# Patient Record
Sex: MALE | Race: BLACK OR AFRICAN AMERICAN | Employment: UNEMPLOYED | URBAN - METROPOLITAN AREA
[De-identification: names, ages, dates, MRNs, and addresses within clinical notes are randomized per-mention and may not be internally consistent; named-entity substitution may affect disease eponyms.]

---

## 2018-03-14 ENCOUNTER — HOSPITAL ENCOUNTER (OUTPATIENT)
Dept: NUCLEAR MEDICINE | Age: 63
Discharge: HOME | End: 2018-03-14
Attending: FAMILY MEDICINE
Payer: MEDICARE

## 2018-03-14 ENCOUNTER — HOSPITAL ENCOUNTER (OUTPATIENT)
Dept: NON INVASIVE DIAGNOSTICS | Age: 63
Discharge: HOME | End: 2018-03-14
Attending: FAMILY MEDICINE
Payer: MEDICARE

## 2018-03-14 DIAGNOSIS — R07.89 CHEST DISCOMFORT: ICD-10-CM

## 2018-03-14 LAB
ATTENDING PHYSICIAN, CST07: NORMAL
DIAGNOSIS, 93000: NORMAL
DUKE TM SCORE RESULT, CST14: NORMAL
DUKE TREADMILL SCORE, CST13: 5456
ECG INTERP BEFORE EX, CST11: NORMAL
ECG INTERP DURING EX, CST12: NORMAL
FUNCTIONAL CAPACITY, CST17: NORMAL
KNOWN CARDIAC CONDITION, CST08: NORMAL
MAX. DIASTOLIC BP, CST04: 88 MMHG
MAX. HEART RATE, CST05: 90 BPM
MAX. SYSTOLIC BP, CST03: 154 MMHG
OVERALL BP RESPONSE TO EXERCISE, CST16: NORMAL
OVERALL HR RESPONSE TO EXERCISE, CST15: NORMAL
PEAK EX METS, CST10: 1 METS
PROTOCOL NAME, CST01: NORMAL
TEST INDICATION, CST09: NORMAL

## 2018-03-14 PROCEDURE — 78452 HT MUSCLE IMAGE SPECT MULT: CPT

## 2018-03-14 PROCEDURE — 93017 CV STRESS TEST TRACING ONLY: CPT

## 2018-03-14 PROCEDURE — 74011250636 HC RX REV CODE- 250/636: Performed by: INTERNAL MEDICINE

## 2018-03-14 RX ORDER — SODIUM CHLORIDE 0.9 % (FLUSH) 0.9 %
SYRINGE (ML) INJECTION
Status: DISCONTINUED
Start: 2018-03-14 | End: 2018-03-18 | Stop reason: HOSPADM

## 2018-03-14 RX ORDER — SODIUM CHLORIDE 0.9 % (FLUSH) 0.9 %
10 SYRINGE (ML) INJECTION AS NEEDED
Status: DISCONTINUED | OUTPATIENT
Start: 2018-03-14 | End: 2018-03-18 | Stop reason: HOSPADM

## 2018-03-14 RX ORDER — SODIUM CHLORIDE 0.9 % (FLUSH) 0.9 %
10 SYRINGE (ML) INJECTION AS NEEDED
Status: DISCONTINUED | OUTPATIENT
Start: 2018-03-14 | End: 2018-03-14

## 2018-03-14 RX ADMIN — Medication 10 ML: at 10:28

## 2018-03-14 RX ADMIN — REGADENOSON 0.4 MG: 0.08 INJECTION, SOLUTION INTRAVENOUS at 10:46

## 2020-08-27 ENCOUNTER — HOSPITAL ENCOUNTER (OUTPATIENT)
Dept: CT IMAGING | Age: 65
Discharge: HOME | End: 2020-08-27
Attending: INTERNAL MEDICINE
Payer: MEDICARE

## 2020-08-27 DIAGNOSIS — J43.9 EMPHYSEMA LUNG (HCC): ICD-10-CM

## 2020-08-27 DIAGNOSIS — R97.20 ELEVATED PSA: ICD-10-CM

## 2020-08-27 LAB — CREAT BLD-MCNC: 1.2 MG/DL (ref 0.6–1.3)

## 2020-08-27 PROCEDURE — 82565 ASSAY OF CREATININE: CPT

## 2020-08-27 PROCEDURE — 74011000255 HC RX REV CODE- 255: Performed by: RADIOLOGY

## 2020-08-27 PROCEDURE — 74177 CT ABD & PELVIS W/CONTRAST: CPT

## 2020-08-27 PROCEDURE — 74011000636 HC RX REV CODE- 636: Performed by: RADIOLOGY

## 2020-08-27 PROCEDURE — 71260 CT THORAX DX C+: CPT

## 2020-08-27 RX ORDER — BARIUM SULFATE 20 MG/ML
900 SUSPENSION ORAL
Status: COMPLETED | OUTPATIENT
Start: 2020-08-27 | End: 2020-08-27

## 2020-08-27 RX ORDER — SODIUM CHLORIDE 0.9 % (FLUSH) 0.9 %
10 SYRINGE (ML) INJECTION
Status: COMPLETED | OUTPATIENT
Start: 2020-08-27 | End: 2020-08-27

## 2020-08-27 RX ADMIN — IOPAMIDOL 100 ML: 755 INJECTION, SOLUTION INTRAVENOUS at 10:59

## 2020-08-27 RX ADMIN — BARIUM SULFATE 900 ML: 20 SUSPENSION ORAL at 10:59

## 2020-08-27 RX ADMIN — Medication 10 ML: at 10:59

## 2021-01-01 ENCOUNTER — HOSPICE ADMISSION (OUTPATIENT)
Dept: HOSPICE | Facility: HOSPICE | Age: 66
End: 2021-01-01

## 2021-01-01 ENCOUNTER — APPOINTMENT (OUTPATIENT)
Dept: GENERAL RADIOLOGY | Age: 66
DRG: 064 | End: 2021-01-01
Attending: STUDENT IN AN ORGANIZED HEALTH CARE EDUCATION/TRAINING PROGRAM
Payer: MEDICARE

## 2021-01-01 ENCOUNTER — APPOINTMENT (OUTPATIENT)
Dept: GENERAL RADIOLOGY | Age: 66
DRG: 064 | End: 2021-01-01
Attending: INTERNAL MEDICINE
Payer: MEDICARE

## 2021-01-01 ENCOUNTER — APPOINTMENT (OUTPATIENT)
Dept: MRI IMAGING | Age: 66
DRG: 064 | End: 2021-01-01
Attending: GENERAL ACUTE CARE HOSPITAL
Payer: MEDICARE

## 2021-01-01 ENCOUNTER — APPOINTMENT (OUTPATIENT)
Dept: CT IMAGING | Age: 66
DRG: 064 | End: 2021-01-01
Attending: PSYCHIATRY & NEUROLOGY
Payer: MEDICARE

## 2021-01-01 ENCOUNTER — APPOINTMENT (OUTPATIENT)
Dept: CT IMAGING | Age: 66
End: 2021-01-01
Attending: EMERGENCY MEDICINE
Payer: MEDICARE

## 2021-01-01 ENCOUNTER — TRANSCRIBE ORDER (OUTPATIENT)
Dept: SCHEDULING | Age: 66
End: 2021-01-01

## 2021-01-01 ENCOUNTER — HOSPITAL ENCOUNTER (INPATIENT)
Age: 66
LOS: 7 days | DRG: 064 | End: 2021-10-14
Attending: EMERGENCY MEDICINE | Admitting: GENERAL ACUTE CARE HOSPITAL
Payer: MEDICARE

## 2021-01-01 ENCOUNTER — APPOINTMENT (OUTPATIENT)
Dept: GENERAL RADIOLOGY | Age: 66
End: 2021-01-01
Attending: EMERGENCY MEDICINE
Payer: MEDICARE

## 2021-01-01 ENCOUNTER — APPOINTMENT (OUTPATIENT)
Dept: CT IMAGING | Age: 66
DRG: 064 | End: 2021-01-01
Attending: STUDENT IN AN ORGANIZED HEALTH CARE EDUCATION/TRAINING PROGRAM
Payer: MEDICARE

## 2021-01-01 ENCOUNTER — APPOINTMENT (OUTPATIENT)
Dept: VASCULAR SURGERY | Age: 66
DRG: 064 | End: 2021-01-01
Attending: PSYCHIATRY & NEUROLOGY
Payer: MEDICARE

## 2021-01-01 ENCOUNTER — HOSPITAL ENCOUNTER (EMERGENCY)
Age: 66
Discharge: HOME | End: 2021-03-21
Attending: EMERGENCY MEDICINE | Admitting: EMERGENCY MEDICINE
Payer: MEDICARE

## 2021-01-01 ENCOUNTER — APPOINTMENT (OUTPATIENT)
Dept: GENERAL RADIOLOGY | Age: 66
DRG: 064 | End: 2021-01-01
Attending: PSYCHIATRY & NEUROLOGY
Payer: MEDICARE

## 2021-01-01 ENCOUNTER — APPOINTMENT (OUTPATIENT)
Dept: CT IMAGING | Age: 66
DRG: 064 | End: 2021-01-01
Attending: INTERNAL MEDICINE
Payer: MEDICARE

## 2021-01-01 ENCOUNTER — APPOINTMENT (OUTPATIENT)
Dept: NON INVASIVE DIAGNOSTICS | Age: 66
DRG: 064 | End: 2021-01-01
Attending: GENERAL ACUTE CARE HOSPITAL
Payer: MEDICARE

## 2021-01-01 VITALS
RESPIRATION RATE: 38 BRPM | HEART RATE: 126 BPM | DIASTOLIC BLOOD PRESSURE: 67 MMHG | BODY MASS INDEX: 28.19 KG/M2 | TEMPERATURE: 98.7 F | HEIGHT: 68 IN | OXYGEN SATURATION: 93 % | SYSTOLIC BLOOD PRESSURE: 104 MMHG | WEIGHT: 186 LBS

## 2021-01-01 VITALS
HEIGHT: 69 IN | RESPIRATION RATE: 18 BRPM | SYSTOLIC BLOOD PRESSURE: 170 MMHG | TEMPERATURE: 99.2 F | WEIGHT: 183 LBS | HEART RATE: 82 BPM | DIASTOLIC BLOOD PRESSURE: 113 MMHG | OXYGEN SATURATION: 98 % | BODY MASS INDEX: 27.11 KG/M2

## 2021-01-01 DIAGNOSIS — I65.23 BILATERAL CAROTID ARTERY STENOSIS: ICD-10-CM

## 2021-01-01 DIAGNOSIS — I63.9 ISCHEMIC STROKE OF FRONTAL LOBE (HCC): Primary | ICD-10-CM

## 2021-01-01 DIAGNOSIS — R07.9 CHEST PAIN IN ADULT: ICD-10-CM

## 2021-01-01 DIAGNOSIS — R42 VERTIGO: Primary | ICD-10-CM

## 2021-01-01 DIAGNOSIS — T79.6XXA TRAUMATIC RHABDOMYOLYSIS, INITIAL ENCOUNTER (HCC): ICD-10-CM

## 2021-01-01 DIAGNOSIS — R22.41 LUMP OF RIGHT THIGH: Primary | ICD-10-CM

## 2021-01-01 DIAGNOSIS — R77.8 TROPONIN LEVEL ELEVATED: ICD-10-CM

## 2021-01-01 DIAGNOSIS — I65.1 BASILAR ARTERY STENOSIS: ICD-10-CM

## 2021-01-01 DIAGNOSIS — I69.30 CHRONIC ISCHEMIC RIGHT MCA STROKE: ICD-10-CM

## 2021-01-01 DIAGNOSIS — R40.0 SOMNOLENCE: ICD-10-CM

## 2021-01-01 LAB
25(OH)D3 SERPL-MCNC: 18.2 NG/ML (ref 30–100)
ALBUMIN SERPL-MCNC: 2.8 G/DL (ref 3.5–5)
ALBUMIN SERPL-MCNC: 3.5 G/DL (ref 3.5–5)
ALBUMIN SERPL-MCNC: 3.7 G/DL (ref 3.5–5)
ALBUMIN/GLOB SERPL: 0.6 {RATIO} (ref 1.1–2.2)
ALBUMIN/GLOB SERPL: 0.8 {RATIO} (ref 1.1–2.2)
ALBUMIN/GLOB SERPL: 0.9 {RATIO} (ref 1.1–2.2)
ALP SERPL-CCNC: 102 U/L (ref 45–117)
ALP SERPL-CCNC: 124 U/L (ref 45–117)
ALP SERPL-CCNC: 130 U/L (ref 45–117)
ALT SERPL-CCNC: 14 U/L (ref 12–78)
ALT SERPL-CCNC: 14 U/L (ref 12–78)
ALT SERPL-CCNC: 18 U/L (ref 12–78)
AMPHET UR QL SCN: NEGATIVE
ANA SER QL: NEGATIVE
ANION GAP SERPL CALC-SCNC: 4 MMOL/L (ref 5–15)
ANION GAP SERPL CALC-SCNC: 5 MMOL/L (ref 5–15)
ANION GAP SERPL CALC-SCNC: 7 MMOL/L (ref 5–15)
ANION GAP SERPL CALC-SCNC: 8 MMOL/L (ref 5–15)
ANION GAP SERPL CALC-SCNC: 9 MMOL/L (ref 5–15)
APAP SERPL-MCNC: <2 UG/ML (ref 10–30)
APPEARANCE UR: CLEAR
APPEARANCE UR: CLEAR
AST SERPL-CCNC: 13 U/L (ref 15–37)
AST SERPL-CCNC: 20 U/L (ref 15–37)
AST SERPL-CCNC: 33 U/L (ref 15–37)
ATRIAL RATE: 68 BPM
ATRIAL RATE: 82 BPM
BACTERIA URNS QL MICRO: NEGATIVE /HPF
BACTERIA URNS QL MICRO: NEGATIVE /HPF
BARBITURATES UR QL SCN: NEGATIVE
BASOPHILS # BLD: 0 K/UL (ref 0–0.1)
BASOPHILS NFR BLD: 0 % (ref 0–1)
BENZODIAZ UR QL: NEGATIVE
BILIRUB SERPL-MCNC: 0.4 MG/DL (ref 0.2–1)
BILIRUB SERPL-MCNC: 0.8 MG/DL (ref 0.2–1)
BILIRUB SERPL-MCNC: 2 MG/DL (ref 0.2–1)
BILIRUB UR QL: NEGATIVE
BILIRUB UR QL: NEGATIVE
BUN SERPL-MCNC: 14 MG/DL (ref 6–20)
BUN SERPL-MCNC: 20 MG/DL (ref 6–20)
BUN SERPL-MCNC: 32 MG/DL (ref 6–20)
BUN SERPL-MCNC: 32 MG/DL (ref 6–20)
BUN SERPL-MCNC: 33 MG/DL (ref 6–20)
BUN SERPL-MCNC: 33 MG/DL (ref 6–20)
BUN SERPL-MCNC: 37 MG/DL (ref 6–20)
BUN/CREAT SERPL: 12 (ref 12–20)
BUN/CREAT SERPL: 17 (ref 12–20)
BUN/CREAT SERPL: 23 (ref 12–20)
BUN/CREAT SERPL: 24 (ref 12–20)
BUN/CREAT SERPL: 29 (ref 12–20)
CALCIUM SERPL-MCNC: 10.1 MG/DL (ref 8.5–10.1)
CALCIUM SERPL-MCNC: 10.1 MG/DL (ref 8.5–10.1)
CALCIUM SERPL-MCNC: 8.6 MG/DL (ref 8.5–10.1)
CALCIUM SERPL-MCNC: 8.8 MG/DL (ref 8.5–10.1)
CALCIUM SERPL-MCNC: 9.3 MG/DL (ref 8.5–10.1)
CALCIUM SERPL-MCNC: 9.4 MG/DL (ref 8.5–10.1)
CALCIUM SERPL-MCNC: 9.6 MG/DL (ref 8.5–10.1)
CALCULATED P AXIS, ECG09: 56 DEGREES
CALCULATED P AXIS, ECG09: 75 DEGREES
CALCULATED R AXIS, ECG10: 35 DEGREES
CALCULATED R AXIS, ECG10: 56 DEGREES
CALCULATED T AXIS, ECG11: 91 DEGREES
CALCULATED T AXIS, ECG11: 94 DEGREES
CANNABINOIDS UR QL SCN: NEGATIVE
CHLORIDE SERPL-SCNC: 104 MMOL/L (ref 97–108)
CHLORIDE SERPL-SCNC: 104 MMOL/L (ref 97–108)
CHLORIDE SERPL-SCNC: 105 MMOL/L (ref 97–108)
CHLORIDE SERPL-SCNC: 105 MMOL/L (ref 97–108)
CHLORIDE SERPL-SCNC: 106 MMOL/L (ref 97–108)
CHLORIDE SERPL-SCNC: 108 MMOL/L (ref 97–108)
CHLORIDE SERPL-SCNC: 114 MMOL/L (ref 97–108)
CHOLEST SERPL-MCNC: 167 MG/DL
CK SERPL-CCNC: 1117 U/L (ref 39–308)
CO2 SERPL-SCNC: 21 MMOL/L (ref 21–32)
CO2 SERPL-SCNC: 22 MMOL/L (ref 21–32)
CO2 SERPL-SCNC: 23 MMOL/L (ref 21–32)
CO2 SERPL-SCNC: 27 MMOL/L (ref 21–32)
CO2 SERPL-SCNC: 28 MMOL/L (ref 21–32)
COCAINE UR QL SCN: NEGATIVE
COLOR UR: ABNORMAL
COLOR UR: ABNORMAL
COMMENT, HOLDF: NORMAL
CREAT SERPL-MCNC: 1.2 MG/DL (ref 0.7–1.3)
CREAT SERPL-MCNC: 1.2 MG/DL (ref 0.7–1.3)
CREAT SERPL-MCNC: 1.26 MG/DL (ref 0.7–1.3)
CREAT SERPL-MCNC: 1.35 MG/DL (ref 0.7–1.3)
CREAT SERPL-MCNC: 1.42 MG/DL (ref 0.7–1.3)
CREAT SERPL-MCNC: 1.43 MG/DL (ref 0.7–1.3)
CREAT SERPL-MCNC: 1.43 MG/DL (ref 0.7–1.3)
DIAGNOSIS, 93000: NORMAL
DIAGNOSIS, 93000: NORMAL
DIFFERENTIAL METHOD BLD: ABNORMAL
DRUG SCRN COMMENT,DRGCM: NORMAL
ECHO AV PEAK GRADIENT: 3.58 MMHG
ECHO AV PEAK VELOCITY: 94.54 CM/S
ECHO LV E' LATERAL VELOCITY: 10.71 CM/S
ECHO LV E' SEPTAL VELOCITY: 6.53 CM/S
ECHO LV EDV A4C: 95.9 ML
ECHO LV EDV INDEX A4C: 48.4 ML/M2
ECHO LV EJECTION FRACTION A4C: 42 PERCENT
ECHO LV ESV A4C: 55.87 ML
ECHO LV ESV INDEX A4C: 28.2 ML/M2
ECHO MV A VELOCITY: 66.59 CM/S
ECHO MV AREA PHT: 4.11 CM2
ECHO MV E DECELERATION TIME (DT): 246.92 MS
ECHO MV E VELOCITY: 54.85 CM/S
ECHO MV E/A RATIO: 0.82
ECHO MV E/E' LATERAL: 5.12
ECHO MV E/E' RATIO (AVERAGED): 6.76
ECHO MV E/E' SEPTAL: 8.4
ECHO MV PRESSURE HALF TIME (PHT): 53.58 MS
ECHO TV REGURGITANT MAX VELOCITY: 93.95 CM/S
ECHO TV REGURGITANT MAX VELOCITY: 98.77 CM/S
ECHO TV REGURGITANT PEAK GRADIENT: 3.53 MMHG
EOSINOPHIL # BLD: 0 K/UL (ref 0–0.4)
EOSINOPHIL NFR BLD: 0 % (ref 0–7)
EPITH CASTS URNS QL MICRO: ABNORMAL /LPF
EPITH CASTS URNS QL MICRO: ABNORMAL /LPF
ERYTHROCYTE [DISTWIDTH] IN BLOOD BY AUTOMATED COUNT: 14 % (ref 11.5–14.5)
ERYTHROCYTE [DISTWIDTH] IN BLOOD BY AUTOMATED COUNT: 14.2 % (ref 11.5–14.5)
ERYTHROCYTE [DISTWIDTH] IN BLOOD BY AUTOMATED COUNT: 14.7 % (ref 11.5–14.5)
ERYTHROCYTE [SEDIMENTATION RATE] IN BLOOD: 24 MM/HR (ref 0–20)
EST. AVERAGE GLUCOSE BLD GHB EST-MCNC: 123 MG/DL
ETHANOL SERPL-MCNC: <10 MG/DL
FOLATE SERPL-MCNC: 6.9 NG/ML (ref 5–21)
GLOBULIN SER CALC-MCNC: 4.2 G/DL (ref 2–4)
GLOBULIN SER CALC-MCNC: 4.4 G/DL (ref 2–4)
GLOBULIN SER CALC-MCNC: 4.9 G/DL (ref 2–4)
GLUCOSE BLD STRIP.AUTO-MCNC: 114 MG/DL (ref 65–117)
GLUCOSE BLD STRIP.AUTO-MCNC: 122 MG/DL (ref 65–117)
GLUCOSE BLD STRIP.AUTO-MCNC: 125 MG/DL (ref 65–117)
GLUCOSE BLD STRIP.AUTO-MCNC: 133 MG/DL (ref 65–117)
GLUCOSE BLD STRIP.AUTO-MCNC: 133 MG/DL (ref 65–117)
GLUCOSE BLD STRIP.AUTO-MCNC: 148 MG/DL (ref 65–117)
GLUCOSE BLD STRIP.AUTO-MCNC: 161 MG/DL (ref 65–117)
GLUCOSE BLD STRIP.AUTO-MCNC: 170 MG/DL (ref 65–117)
GLUCOSE BLD STRIP.AUTO-MCNC: 172 MG/DL (ref 65–117)
GLUCOSE BLD STRIP.AUTO-MCNC: 182 MG/DL (ref 65–117)
GLUCOSE BLD STRIP.AUTO-MCNC: 182 MG/DL (ref 65–117)
GLUCOSE BLD STRIP.AUTO-MCNC: 187 MG/DL (ref 65–117)
GLUCOSE BLD STRIP.AUTO-MCNC: 188 MG/DL (ref 65–117)
GLUCOSE BLD STRIP.AUTO-MCNC: 200 MG/DL (ref 65–117)
GLUCOSE BLD STRIP.AUTO-MCNC: 203 MG/DL (ref 65–117)
GLUCOSE BLD STRIP.AUTO-MCNC: 216 MG/DL (ref 65–117)
GLUCOSE BLD STRIP.AUTO-MCNC: 225 MG/DL (ref 65–117)
GLUCOSE BLD STRIP.AUTO-MCNC: 232 MG/DL (ref 65–117)
GLUCOSE BLD STRIP.AUTO-MCNC: 238 MG/DL (ref 65–117)
GLUCOSE BLD STRIP.AUTO-MCNC: 243 MG/DL (ref 65–117)
GLUCOSE SERPL-MCNC: 157 MG/DL (ref 65–100)
GLUCOSE SERPL-MCNC: 163 MG/DL (ref 65–100)
GLUCOSE SERPL-MCNC: 193 MG/DL (ref 65–100)
GLUCOSE SERPL-MCNC: 198 MG/DL (ref 65–100)
GLUCOSE SERPL-MCNC: 229 MG/DL (ref 65–100)
GLUCOSE SERPL-MCNC: 244 MG/DL (ref 65–100)
GLUCOSE SERPL-MCNC: 281 MG/DL (ref 65–100)
GLUCOSE UR STRIP.AUTO-MCNC: NEGATIVE MG/DL
GLUCOSE UR STRIP.AUTO-MCNC: NEGATIVE MG/DL
HBA1C MFR BLD: 5.9 % (ref 4–5.6)
HCT VFR BLD AUTO: 43.1 % (ref 36.6–50.3)
HCT VFR BLD AUTO: 45.4 % (ref 36.6–50.3)
HCT VFR BLD AUTO: 46.3 % (ref 36.6–50.3)
HCYS SERPL-SCNC: 25.9 UMOL/L (ref 3.7–13.9)
HDLC SERPL-MCNC: 55 MG/DL
HDLC SERPL: 3 {RATIO} (ref 0–5)
HGB BLD-MCNC: 15.7 G/DL (ref 12.1–17)
HGB BLD-MCNC: 16.4 G/DL (ref 12.1–17)
HGB BLD-MCNC: 16.9 G/DL (ref 12.1–17)
HGB UR QL STRIP: ABNORMAL
HGB UR QL STRIP: ABNORMAL
HYALINE CASTS URNS QL MICRO: ABNORMAL /LPF (ref 0–5)
HYALINE CASTS URNS QL MICRO: ABNORMAL /LPF (ref 0–5)
IMM GRANULOCYTES # BLD AUTO: 0 K/UL (ref 0–0.04)
IMM GRANULOCYTES NFR BLD AUTO: 0 % (ref 0–0.5)
KETONES UR QL STRIP.AUTO: 15 MG/DL
KETONES UR QL STRIP.AUTO: 15 MG/DL
LACTATE SERPL-SCNC: 1.8 MMOL/L (ref 0.4–2)
LACTATE SERPL-SCNC: 1.9 MMOL/L (ref 0.4–2)
LDLC SERPL CALC-MCNC: 96.8 MG/DL (ref 0–100)
LEUKOCYTE ESTERASE UR QL STRIP.AUTO: NEGATIVE
LEUKOCYTE ESTERASE UR QL STRIP.AUTO: NEGATIVE
LIPASE SERPL-CCNC: 97 U/L (ref 73–393)
LYMPHOCYTES # BLD: 0.3 K/UL (ref 0.8–3.5)
LYMPHOCYTES # BLD: 0.3 K/UL (ref 0.8–3.5)
LYMPHOCYTES # BLD: 0.8 K/UL (ref 0.8–3.5)
LYMPHOCYTES NFR BLD: 11 % (ref 12–49)
LYMPHOCYTES NFR BLD: 3 % (ref 12–49)
LYMPHOCYTES NFR BLD: 4 % (ref 12–49)
MAGNESIUM SERPL-MCNC: 2 MG/DL (ref 1.6–2.4)
MCH RBC QN AUTO: 28.2 PG (ref 26–34)
MCH RBC QN AUTO: 31.3 PG (ref 26–34)
MCH RBC QN AUTO: 31.4 PG (ref 26–34)
MCHC RBC AUTO-ENTMCNC: 35.4 G/DL (ref 30–36.5)
MCHC RBC AUTO-ENTMCNC: 36.4 G/DL (ref 30–36.5)
MCHC RBC AUTO-ENTMCNC: 37.2 G/DL (ref 30–36.5)
MCV RBC AUTO: 79.6 FL (ref 80–99)
MCV RBC AUTO: 84.2 FL (ref 80–99)
MCV RBC AUTO: 86 FL (ref 80–99)
METHADONE UR QL: NEGATIVE
MONOCYTES # BLD: 0.3 K/UL (ref 0–1)
MONOCYTES # BLD: 0.4 K/UL (ref 0–1)
MONOCYTES # BLD: 0.4 K/UL (ref 0–1)
MONOCYTES NFR BLD: 3 % (ref 5–13)
MONOCYTES NFR BLD: 5 % (ref 5–13)
MONOCYTES NFR BLD: 5 % (ref 5–13)
NEUTS BAND NFR BLD MANUAL: 13 %
NEUTS SEG # BLD: 10.9 K/UL (ref 1.8–8)
NEUTS SEG # BLD: 6.2 K/UL (ref 1.8–8)
NEUTS SEG # BLD: 6.5 K/UL (ref 1.8–8)
NEUTS SEG NFR BLD: 81 % (ref 32–75)
NEUTS SEG NFR BLD: 84 % (ref 32–75)
NEUTS SEG NFR BLD: 91 % (ref 32–75)
NITRITE UR QL STRIP.AUTO: NEGATIVE
NITRITE UR QL STRIP.AUTO: NEGATIVE
NRBC # BLD: 0 K/UL (ref 0–0.01)
NRBC BLD-RTO: 0 PER 100 WBC
OPIATES UR QL: NEGATIVE
OSMOLALITY SERPL: 313 MOSM/KG H2O
OSMOLALITY SERPL: 322 MOSM/KG H2O
OSMOLALITY SERPL: 324 MOSM/KG H2O
OSMOLALITY SERPL: 325 MOSM/KG H2O
P-R INTERVAL, ECG05: 132 MS
P-R INTERVAL, ECG05: 140 MS
PCP UR QL: NEGATIVE
PH UR STRIP: 7.5 [PH] (ref 5–8)
PH UR STRIP: 8 [PH] (ref 5–8)
PLATELET # BLD AUTO: 255 K/UL (ref 150–400)
PLATELET # BLD AUTO: 257 K/UL (ref 150–400)
PLATELET # BLD AUTO: 286 K/UL (ref 150–400)
PMV BLD AUTO: 9.1 FL (ref 8.9–12.9)
PMV BLD AUTO: 9.5 FL (ref 8.9–12.9)
PMV BLD AUTO: 9.6 FL (ref 8.9–12.9)
POTASSIUM SERPL-SCNC: 3.2 MMOL/L (ref 3.5–5.1)
POTASSIUM SERPL-SCNC: 3.4 MMOL/L (ref 3.5–5.1)
POTASSIUM SERPL-SCNC: 3.6 MMOL/L (ref 3.5–5.1)
POTASSIUM SERPL-SCNC: 3.9 MMOL/L (ref 3.5–5.1)
POTASSIUM SERPL-SCNC: 4 MMOL/L (ref 3.5–5.1)
POTASSIUM SERPL-SCNC: 4 MMOL/L (ref 3.5–5.1)
POTASSIUM SERPL-SCNC: 4.6 MMOL/L (ref 3.5–5.1)
PROCALCITONIN SERPL-MCNC: 2.02 NG/ML
PROT SERPL-MCNC: 7.7 G/DL (ref 6.4–8.2)
PROT SERPL-MCNC: 7.9 G/DL (ref 6.4–8.2)
PROT SERPL-MCNC: 7.9 G/DL (ref 6.4–8.2)
PROT UR STRIP-MCNC: ABNORMAL MG/DL
PROT UR STRIP-MCNC: NEGATIVE MG/DL
Q-T INTERVAL, ECG07: 378 MS
Q-T INTERVAL, ECG07: 424 MS
QRS DURATION, ECG06: 76 MS
QRS DURATION, ECG06: 80 MS
QTC CALCULATION (BEZET), ECG08: 441 MS
QTC CALCULATION (BEZET), ECG08: 450 MS
RBC # BLD AUTO: 5.01 M/UL (ref 4.1–5.7)
RBC # BLD AUTO: 5.39 M/UL (ref 4.1–5.7)
RBC # BLD AUTO: 5.82 M/UL (ref 4.1–5.7)
RBC #/AREA URNS HPF: ABNORMAL /HPF (ref 0–5)
RBC #/AREA URNS HPF: ABNORMAL /HPF (ref 0–5)
RBC MORPH BLD: ABNORMAL
RBC MORPH BLD: ABNORMAL
SALICYLATES SERPL-MCNC: <1.7 MG/DL (ref 2.8–20)
SAMPLES BEING HELD,HOLD: NORMAL
SERVICE CMNT-IMP: ABNORMAL
SERVICE CMNT-IMP: NORMAL
SODIUM SERPL-SCNC: 134 MMOL/L (ref 136–145)
SODIUM SERPL-SCNC: 135 MMOL/L (ref 136–145)
SODIUM SERPL-SCNC: 136 MMOL/L (ref 136–145)
SODIUM SERPL-SCNC: 137 MMOL/L (ref 136–145)
SODIUM SERPL-SCNC: 137 MMOL/L (ref 136–145)
SODIUM SERPL-SCNC: 139 MMOL/L (ref 136–145)
SODIUM SERPL-SCNC: 144 MMOL/L (ref 136–145)
SP GR UR REFRACTOMETRY: 1.01 (ref 1–1.03)
SP GR UR REFRACTOMETRY: 1.02 (ref 1–1.03)
TRIGL SERPL-MCNC: 76 MG/DL (ref ?–150)
TROPONIN I SERPL-MCNC: 0.05 NG/ML
TROPONIN I SERPL-MCNC: 0.1 NG/ML
TROPONIN I SERPL-MCNC: 0.12 NG/ML
TSH SERPL DL<=0.05 MIU/L-ACNC: 0.66 UIU/ML (ref 0.36–3.74)
UA: UC IF INDICATED,UAUC: ABNORMAL
UA: UC IF INDICATED,UAUC: ABNORMAL
UROBILINOGEN UR QL STRIP.AUTO: 1 EU/DL (ref 0.2–1)
UROBILINOGEN UR QL STRIP.AUTO: 2 EU/DL (ref 0.2–1)
VENTRICULAR RATE, ECG03: 68 BPM
VENTRICULAR RATE, ECG03: 82 BPM
VIT B12 SERPL-MCNC: 192 PG/ML (ref 193–986)
VLDLC SERPL CALC-MCNC: 15.2 MG/DL
WBC # BLD AUTO: 11.5 K/UL (ref 4.1–11.1)
WBC # BLD AUTO: 7.2 K/UL (ref 4.1–11.1)
WBC # BLD AUTO: 7.4 K/UL (ref 4.1–11.1)
WBC URNS QL MICRO: ABNORMAL /HPF (ref 0–4)
WBC URNS QL MICRO: ABNORMAL /HPF (ref 0–4)

## 2021-01-01 PROCEDURE — 70551 MRI BRAIN STEM W/O DYE: CPT

## 2021-01-01 PROCEDURE — 99233 SBSQ HOSP IP/OBS HIGH 50: CPT | Performed by: PSYCHIATRY & NEUROLOGY

## 2021-01-01 PROCEDURE — 74011250636 HC RX REV CODE- 250/636: Performed by: INTERNAL MEDICINE

## 2021-01-01 PROCEDURE — 2709999900 HC NON-CHARGEABLE SUPPLY

## 2021-01-01 PROCEDURE — 74011000250 HC RX REV CODE- 250: Performed by: INTERNAL MEDICINE

## 2021-01-01 PROCEDURE — 80053 COMPREHEN METABOLIC PANEL: CPT

## 2021-01-01 PROCEDURE — 83735 ASSAY OF MAGNESIUM: CPT

## 2021-01-01 PROCEDURE — 77010033678 HC OXYGEN DAILY

## 2021-01-01 PROCEDURE — 83930 ASSAY OF BLOOD OSMOLALITY: CPT

## 2021-01-01 PROCEDURE — 70450 CT HEAD/BRAIN W/O DYE: CPT

## 2021-01-01 PROCEDURE — 80143 DRUG ASSAY ACETAMINOPHEN: CPT

## 2021-01-01 PROCEDURE — 71045 X-RAY EXAM CHEST 1 VIEW: CPT

## 2021-01-01 PROCEDURE — 74011000258 HC RX REV CODE- 258: Performed by: INTERNAL MEDICINE

## 2021-01-01 PROCEDURE — 85025 COMPLETE CBC W/AUTO DIFF WBC: CPT

## 2021-01-01 PROCEDURE — 99285 EMERGENCY DEPT VISIT HI MDM: CPT

## 2021-01-01 PROCEDURE — 74011636637 HC RX REV CODE- 636/637: Performed by: GENERAL ACUTE CARE HOSPITAL

## 2021-01-01 PROCEDURE — 82962 GLUCOSE BLOOD TEST: CPT

## 2021-01-01 PROCEDURE — 65270000015 HC RM PRIVATE ONCOLOGY

## 2021-01-01 PROCEDURE — 36415 COLL VENOUS BLD VENIPUNCTURE: CPT

## 2021-01-01 PROCEDURE — 80048 BASIC METABOLIC PNL TOTAL CA: CPT

## 2021-01-01 PROCEDURE — 74011250636 HC RX REV CODE- 250/636: Performed by: GENERAL ACUTE CARE HOSPITAL

## 2021-01-01 PROCEDURE — 84484 ASSAY OF TROPONIN QUANT: CPT

## 2021-01-01 PROCEDURE — 83690 ASSAY OF LIPASE: CPT

## 2021-01-01 PROCEDURE — 74011250637 HC RX REV CODE- 250/637: Performed by: INTERNAL MEDICINE

## 2021-01-01 PROCEDURE — 95816 EEG AWAKE AND DROWSY: CPT | Performed by: PSYCHIATRY & NEUROLOGY

## 2021-01-01 PROCEDURE — 70498 CT ANGIOGRAPHY NECK: CPT

## 2021-01-01 PROCEDURE — 84145 PROCALCITONIN (PCT): CPT

## 2021-01-01 PROCEDURE — 83090 ASSAY OF HOMOCYSTEINE: CPT

## 2021-01-01 PROCEDURE — 83036 HEMOGLOBIN GLYCOSYLATED A1C: CPT

## 2021-01-01 PROCEDURE — 82746 ASSAY OF FOLIC ACID SERUM: CPT

## 2021-01-01 PROCEDURE — 93306 TTE W/DOPPLER COMPLETE: CPT

## 2021-01-01 PROCEDURE — 74011250637 HC RX REV CODE- 250/637: Performed by: GENERAL ACUTE CARE HOSPITAL

## 2021-01-01 PROCEDURE — 74011250636 HC RX REV CODE- 250/636: Performed by: NURSE PRACTITIONER

## 2021-01-01 PROCEDURE — 96374 THER/PROPH/DIAG INJ IV PUSH: CPT

## 2021-01-01 PROCEDURE — 81001 URINALYSIS AUTO W/SCOPE: CPT

## 2021-01-01 PROCEDURE — 74011250636 HC RX REV CODE- 250/636: Performed by: EMERGENCY MEDICINE

## 2021-01-01 PROCEDURE — 83605 ASSAY OF LACTIC ACID: CPT

## 2021-01-01 PROCEDURE — 74011000636 HC RX REV CODE- 636: Performed by: EMERGENCY MEDICINE

## 2021-01-01 PROCEDURE — 74011250637 HC RX REV CODE- 250/637: Performed by: EMERGENCY MEDICINE

## 2021-01-01 PROCEDURE — 65660000001 HC RM ICU INTERMED STEPDOWN

## 2021-01-01 PROCEDURE — 82550 ASSAY OF CK (CPK): CPT

## 2021-01-01 PROCEDURE — 97535 SELF CARE MNGMENT TRAINING: CPT

## 2021-01-01 PROCEDURE — 97530 THERAPEUTIC ACTIVITIES: CPT

## 2021-01-01 PROCEDURE — 97162 PT EVAL MOD COMPLEX 30 MIN: CPT

## 2021-01-01 PROCEDURE — 94760 N-INVAS EAR/PLS OXIMETRY 1: CPT

## 2021-01-01 PROCEDURE — 99223 1ST HOSP IP/OBS HIGH 75: CPT | Performed by: PSYCHIATRY & NEUROLOGY

## 2021-01-01 PROCEDURE — 92610 EVALUATE SWALLOWING FUNCTION: CPT | Performed by: SPEECH-LANGUAGE PATHOLOGIST

## 2021-01-01 PROCEDURE — 86038 ANTINUCLEAR ANTIBODIES: CPT

## 2021-01-01 PROCEDURE — 82607 VITAMIN B-12: CPT

## 2021-01-01 PROCEDURE — 99232 SBSQ HOSP IP/OBS MODERATE 35: CPT | Performed by: PSYCHIATRY & NEUROLOGY

## 2021-01-01 PROCEDURE — 82077 ASSAY SPEC XCP UR&BREATH IA: CPT

## 2021-01-01 PROCEDURE — 80179 DRUG ASSAY SALICYLATE: CPT

## 2021-01-01 PROCEDURE — 80061 LIPID PANEL: CPT

## 2021-01-01 PROCEDURE — 97165 OT EVAL LOW COMPLEX 30 MIN: CPT

## 2021-01-01 PROCEDURE — 80307 DRUG TEST PRSMV CHEM ANLYZR: CPT

## 2021-01-01 PROCEDURE — 82306 VITAMIN D 25 HYDROXY: CPT

## 2021-01-01 PROCEDURE — 93005 ELECTROCARDIOGRAM TRACING: CPT

## 2021-01-01 PROCEDURE — 84443 ASSAY THYROID STIM HORMONE: CPT

## 2021-01-01 PROCEDURE — 65660000000 HC RM CCU STEPDOWN

## 2021-01-01 PROCEDURE — 93306 TTE W/DOPPLER COMPLETE: CPT | Performed by: INTERNAL MEDICINE

## 2021-01-01 PROCEDURE — 85652 RBC SED RATE AUTOMATED: CPT

## 2021-01-01 PROCEDURE — 87040 BLOOD CULTURE FOR BACTERIA: CPT

## 2021-01-01 RX ORDER — DEXTROSE 50 % IN WATER (D50W) INTRAVENOUS SYRINGE
12.5-25 AS NEEDED
Status: DISCONTINUED | OUTPATIENT
Start: 2021-01-01 | End: 2021-01-01 | Stop reason: HOSPADM

## 2021-01-01 RX ORDER — METOPROLOL TARTRATE 5 MG/5ML
5 INJECTION INTRAVENOUS EVERY 6 HOURS
Status: DISCONTINUED | OUTPATIENT
Start: 2021-01-01 | End: 2021-01-01

## 2021-01-01 RX ORDER — ALBUTEROL SULFATE 90 UG/1
2 AEROSOL, METERED RESPIRATORY (INHALATION)
COMMUNITY

## 2021-01-01 RX ORDER — METHOCARBAMOL 750 MG/1
750 TABLET, FILM COATED ORAL
Status: COMPLETED | OUTPATIENT
Start: 2021-01-01 | End: 2021-01-01

## 2021-01-01 RX ORDER — ACETAMINOPHEN 325 MG/1
650 TABLET ORAL
Status: DISCONTINUED | OUTPATIENT
Start: 2021-01-01 | End: 2021-01-01 | Stop reason: SDUPTHER

## 2021-01-01 RX ORDER — MORPHINE SULFATE 2 MG/ML
2 INJECTION, SOLUTION INTRAMUSCULAR; INTRAVENOUS
Status: DISCONTINUED | OUTPATIENT
Start: 2021-01-01 | End: 2021-01-01 | Stop reason: HOSPADM

## 2021-01-01 RX ORDER — FENTANYL CITRATE 50 UG/ML
25 INJECTION, SOLUTION INTRAMUSCULAR; INTRAVENOUS
Status: DISCONTINUED | OUTPATIENT
Start: 2021-01-01 | End: 2021-01-01

## 2021-01-01 RX ORDER — ASPIRIN 300 MG/1
300 SUPPOSITORY RECTAL DAILY
Status: DISCONTINUED | OUTPATIENT
Start: 2021-01-01 | End: 2021-01-01

## 2021-01-01 RX ORDER — IPRATROPIUM BROMIDE AND ALBUTEROL SULFATE 2.5; .5 MG/3ML; MG/3ML
3 SOLUTION RESPIRATORY (INHALATION)
Status: DISCONTINUED | OUTPATIENT
Start: 2021-01-01 | End: 2021-01-01 | Stop reason: HOSPADM

## 2021-01-01 RX ORDER — INSULIN LISPRO 100 [IU]/ML
INJECTION, SOLUTION INTRAVENOUS; SUBCUTANEOUS
Status: DISCONTINUED | OUTPATIENT
Start: 2021-01-01 | End: 2021-01-01

## 2021-01-01 RX ORDER — MECLIZINE HYDROCHLORIDE 25 MG/1
25 TABLET ORAL
Qty: 20 TAB | Refills: 0 | Status: ON HOLD | OUTPATIENT
Start: 2021-01-01 | End: 2021-01-01

## 2021-01-01 RX ORDER — ONDANSETRON 2 MG/ML
4 INJECTION INTRAMUSCULAR; INTRAVENOUS
Status: COMPLETED | OUTPATIENT
Start: 2021-01-01 | End: 2021-01-01

## 2021-01-01 RX ORDER — METOPROLOL TARTRATE 25 MG/1
25 TABLET, FILM COATED ORAL 2 TIMES DAILY
Status: DISCONTINUED | OUTPATIENT
Start: 2021-01-01 | End: 2021-01-01

## 2021-01-01 RX ORDER — LISINOPRIL 5 MG/1
10 TABLET ORAL DAILY
Status: DISCONTINUED | OUTPATIENT
Start: 2021-01-01 | End: 2021-01-01 | Stop reason: HOSPADM

## 2021-01-01 RX ORDER — GLYCOPYRROLATE 0.2 MG/ML
0.2 INJECTION INTRAMUSCULAR; INTRAVENOUS
Status: DISCONTINUED | OUTPATIENT
Start: 2021-01-01 | End: 2021-01-01 | Stop reason: HOSPADM

## 2021-01-01 RX ORDER — CLOPIDOGREL BISULFATE 75 MG/1
75 TABLET ORAL DAILY
Status: DISCONTINUED | OUTPATIENT
Start: 2021-01-01 | End: 2021-01-01 | Stop reason: HOSPADM

## 2021-01-01 RX ORDER — MANNITOL 20 G/100ML
0.25 INJECTION, SOLUTION INTRAVENOUS EVERY 6 HOURS
Status: DISCONTINUED | OUTPATIENT
Start: 2021-01-01 | End: 2021-01-01

## 2021-01-01 RX ORDER — METHOCARBAMOL 750 MG/1
750 TABLET, FILM COATED ORAL
Qty: 20 TAB | Refills: 0 | Status: ON HOLD | OUTPATIENT
Start: 2021-01-01 | End: 2021-01-01

## 2021-01-01 RX ORDER — AMLODIPINE BESYLATE 10 MG/1
10 TABLET ORAL DAILY
COMMUNITY

## 2021-01-01 RX ORDER — FUROSEMIDE 10 MG/ML
40 INJECTION INTRAMUSCULAR; INTRAVENOUS ONCE
Status: COMPLETED | OUTPATIENT
Start: 2021-01-01 | End: 2021-01-01

## 2021-01-01 RX ORDER — ISOSORBIDE MONONITRATE 30 MG/1
60 TABLET, EXTENDED RELEASE ORAL DAILY
Status: DISCONTINUED | OUTPATIENT
Start: 2021-01-01 | End: 2021-01-01

## 2021-01-01 RX ORDER — GABAPENTIN 100 MG/1
100 CAPSULE ORAL 3 TIMES DAILY
Status: DISCONTINUED | OUTPATIENT
Start: 2021-01-01 | End: 2021-01-01

## 2021-01-01 RX ORDER — HYDRALAZINE HYDROCHLORIDE 20 MG/ML
10 INJECTION INTRAMUSCULAR; INTRAVENOUS
Status: DISCONTINUED | OUTPATIENT
Start: 2021-01-01 | End: 2021-01-01

## 2021-01-01 RX ORDER — ATORVASTATIN CALCIUM 40 MG/1
40 TABLET, FILM COATED ORAL DAILY
Status: DISCONTINUED | OUTPATIENT
Start: 2021-01-01 | End: 2021-01-01 | Stop reason: HOSPADM

## 2021-01-01 RX ORDER — MANNITOL 250 MG/ML
0.25 INJECTION, SOLUTION INTRAVENOUS EVERY 6 HOURS
Status: DISCONTINUED | OUTPATIENT
Start: 2021-01-01 | End: 2021-01-01 | Stop reason: SDUPTHER

## 2021-01-01 RX ORDER — METOPROLOL TARTRATE 50 MG/1
50 TABLET ORAL 2 TIMES DAILY
Status: DISCONTINUED | OUTPATIENT
Start: 2021-01-01 | End: 2021-01-01

## 2021-01-01 RX ORDER — HYDRALAZINE HYDROCHLORIDE 20 MG/ML
20 INJECTION INTRAMUSCULAR; INTRAVENOUS
Status: ACTIVE | OUTPATIENT
Start: 2021-01-01 | End: 2021-01-01

## 2021-01-01 RX ORDER — ENOXAPARIN SODIUM 100 MG/ML
40 INJECTION SUBCUTANEOUS EVERY 24 HOURS
Status: DISCONTINUED | OUTPATIENT
Start: 2021-01-01 | End: 2021-01-01 | Stop reason: HOSPADM

## 2021-01-01 RX ORDER — TAMSULOSIN HYDROCHLORIDE 0.4 MG/1
0.4 CAPSULE ORAL DAILY
Status: DISCONTINUED | OUTPATIENT
Start: 2021-01-01 | End: 2021-01-01

## 2021-01-01 RX ORDER — LISINOPRIL 20 MG/1
20 TABLET ORAL DAILY
Status: DISCONTINUED | OUTPATIENT
Start: 2021-01-01 | End: 2021-01-01

## 2021-01-01 RX ORDER — MANNITOL 20 G/100ML
75 INJECTION, SOLUTION INTRAVENOUS ONCE
Status: COMPLETED | OUTPATIENT
Start: 2021-01-01 | End: 2021-01-01

## 2021-01-01 RX ORDER — ACETAMINOPHEN 325 MG/1
650 TABLET ORAL
Status: DISCONTINUED | OUTPATIENT
Start: 2021-01-01 | End: 2021-01-01 | Stop reason: HOSPADM

## 2021-01-01 RX ORDER — ACETAMINOPHEN 650 MG/1
650 SUPPOSITORY RECTAL
Status: DISCONTINUED | OUTPATIENT
Start: 2021-01-01 | End: 2021-01-01 | Stop reason: HOSPADM

## 2021-01-01 RX ORDER — MORPHINE SULFATE 2 MG/ML
2 INJECTION, SOLUTION INTRAMUSCULAR; INTRAVENOUS
Status: DISCONTINUED | OUTPATIENT
Start: 2021-01-01 | End: 2021-01-01

## 2021-01-01 RX ORDER — MAGNESIUM SULFATE 100 %
4 CRYSTALS MISCELLANEOUS AS NEEDED
Status: DISCONTINUED | OUTPATIENT
Start: 2021-01-01 | End: 2021-01-01 | Stop reason: HOSPADM

## 2021-01-01 RX ORDER — ONDANSETRON 2 MG/ML
4 INJECTION INTRAMUSCULAR; INTRAVENOUS
Status: DISCONTINUED | OUTPATIENT
Start: 2021-01-01 | End: 2021-01-01 | Stop reason: HOSPADM

## 2021-01-01 RX ORDER — ISOSORBIDE DINITRATE 20 MG/1
30 TABLET ORAL 3 TIMES DAILY
Status: DISCONTINUED | OUTPATIENT
Start: 2021-01-01 | End: 2021-01-01

## 2021-01-01 RX ORDER — MANNITOL 250 MG/ML
75 INJECTION, SOLUTION INTRAVENOUS ONCE
Status: DISCONTINUED | OUTPATIENT
Start: 2021-01-01 | End: 2021-01-01

## 2021-01-01 RX ORDER — SODIUM CHLORIDE 9 MG/ML
25 INJECTION, SOLUTION INTRAVENOUS CONTINUOUS
Status: DISCONTINUED | OUTPATIENT
Start: 2021-01-01 | End: 2021-01-01

## 2021-01-01 RX ORDER — ASPIRIN 81 MG/1
81 TABLET ORAL DAILY
Status: DISCONTINUED | OUTPATIENT
Start: 2021-01-01 | End: 2021-01-01 | Stop reason: HOSPADM

## 2021-01-01 RX ORDER — LORAZEPAM 2 MG/ML
1 INJECTION INTRAMUSCULAR
Status: DISCONTINUED | OUTPATIENT
Start: 2021-01-01 | End: 2021-01-01 | Stop reason: HOSPADM

## 2021-01-01 RX ORDER — MECLIZINE HCL 12.5 MG 12.5 MG/1
25 TABLET ORAL
Status: COMPLETED | OUTPATIENT
Start: 2021-01-01 | End: 2021-01-01

## 2021-01-01 RX ADMIN — SODIUM CHLORIDE 15 MG/HR: 900 INJECTION, SOLUTION INTRAVENOUS at 04:03

## 2021-01-01 RX ADMIN — MORPHINE SULFATE 2 MG: 2 INJECTION, SOLUTION INTRAMUSCULAR; INTRAVENOUS at 14:55

## 2021-01-01 RX ADMIN — METHOCARBAMOL TABLETS 750 MG: 750 TABLET, COATED ORAL at 14:05

## 2021-01-01 RX ADMIN — PIPERACILLIN AND TAZOBACTAM 3.38 G: 3; .375 INJECTION, POWDER, LYOPHILIZED, FOR SOLUTION INTRAVENOUS at 18:32

## 2021-01-01 RX ADMIN — HYDRALAZINE HYDROCHLORIDE 10 MG: 20 INJECTION INTRAMUSCULAR; INTRAVENOUS at 18:33

## 2021-01-01 RX ADMIN — ASPIRIN 81 MG: 81 TABLET, COATED ORAL at 09:46

## 2021-01-01 RX ADMIN — INSULIN LISPRO 2 UNITS: 100 INJECTION, SOLUTION INTRAVENOUS; SUBCUTANEOUS at 11:30

## 2021-01-01 RX ADMIN — INSULIN LISPRO 3 UNITS: 100 INJECTION, SOLUTION INTRAVENOUS; SUBCUTANEOUS at 12:32

## 2021-01-01 RX ADMIN — CLOPIDOGREL BISULFATE 75 MG: 75 TABLET ORAL at 09:29

## 2021-01-01 RX ADMIN — MANNITOL 21.1 G: 20 INJECTION, SOLUTION INTRAVENOUS at 19:33

## 2021-01-01 RX ADMIN — SODIUM CHLORIDE 15 MG/HR: 900 INJECTION, SOLUTION INTRAVENOUS at 07:17

## 2021-01-01 RX ADMIN — GLYCOPYRROLATE 0.2 MG: 0.2 INJECTION INTRAMUSCULAR; INTRAVENOUS at 13:15

## 2021-01-01 RX ADMIN — MORPHINE SULFATE 2 MG: 2 INJECTION, SOLUTION INTRAMUSCULAR; INTRAVENOUS at 10:31

## 2021-01-01 RX ADMIN — MORPHINE SULFATE 2 MG: 2 INJECTION, SOLUTION INTRAMUSCULAR; INTRAVENOUS at 17:05

## 2021-01-01 RX ADMIN — FUROSEMIDE 40 MG: 10 INJECTION, SOLUTION INTRAMUSCULAR; INTRAVENOUS at 21:34

## 2021-01-01 RX ADMIN — MORPHINE SULFATE 2 MG: 2 INJECTION, SOLUTION INTRAMUSCULAR; INTRAVENOUS at 17:03

## 2021-01-01 RX ADMIN — ENOXAPARIN SODIUM 40 MG: 40 INJECTION SUBCUTANEOUS at 09:22

## 2021-01-01 RX ADMIN — SODIUM CHLORIDE 15 MG/HR: 900 INJECTION, SOLUTION INTRAVENOUS at 11:51

## 2021-01-01 RX ADMIN — GABAPENTIN 100 MG: 100 CAPSULE ORAL at 17:23

## 2021-01-01 RX ADMIN — ASPIRIN 300 MG: 300 SUPPOSITORY RECTAL at 09:49

## 2021-01-01 RX ADMIN — SODIUM CHLORIDE 1000 ML: 9 INJECTION, SOLUTION INTRAVENOUS at 16:49

## 2021-01-01 RX ADMIN — METOPROLOL TARTRATE 5 MG: 1 INJECTION, SOLUTION INTRAVENOUS at 00:09

## 2021-01-01 RX ADMIN — TAMSULOSIN HYDROCHLORIDE 0.4 MG: 0.4 CAPSULE ORAL at 09:29

## 2021-01-01 RX ADMIN — ISOSORBIDE MONONITRATE 60 MG: 30 TABLET, EXTENDED RELEASE ORAL at 09:47

## 2021-01-01 RX ADMIN — METOPROLOL TARTRATE 5 MG: 1 INJECTION, SOLUTION INTRAVENOUS at 01:15

## 2021-01-01 RX ADMIN — METOPROLOL TARTRATE 50 MG: 50 TABLET, FILM COATED ORAL at 09:48

## 2021-01-01 RX ADMIN — MORPHINE SULFATE 2 MG: 2 INJECTION, SOLUTION INTRAMUSCULAR; INTRAVENOUS at 17:54

## 2021-01-01 RX ADMIN — MORPHINE SULFATE 2 MG: 2 INJECTION, SOLUTION INTRAMUSCULAR; INTRAVENOUS at 15:31

## 2021-01-01 RX ADMIN — METOPROLOL TARTRATE 5 MG: 1 INJECTION, SOLUTION INTRAVENOUS at 18:25

## 2021-01-01 RX ADMIN — LORAZEPAM 1 MG: 2 INJECTION INTRAMUSCULAR; INTRAVENOUS at 17:05

## 2021-01-01 RX ADMIN — IOPAMIDOL 100 ML: 755 INJECTION, SOLUTION INTRAVENOUS at 06:20

## 2021-01-01 RX ADMIN — METOPROLOL TARTRATE 5 MG: 1 INJECTION, SOLUTION INTRAVENOUS at 12:22

## 2021-01-01 RX ADMIN — SODIUM CHLORIDE 12.5 MG/HR: 900 INJECTION, SOLUTION INTRAVENOUS at 17:03

## 2021-01-01 RX ADMIN — SODIUM CHLORIDE 15 MG/HR: 900 INJECTION, SOLUTION INTRAVENOUS at 22:53

## 2021-01-01 RX ADMIN — MORPHINE SULFATE 2 MG: 2 INJECTION, SOLUTION INTRAMUSCULAR; INTRAVENOUS at 18:00

## 2021-01-01 RX ADMIN — METOPROLOL TARTRATE 50 MG: 50 TABLET, FILM COATED ORAL at 09:29

## 2021-01-01 RX ADMIN — LORAZEPAM 1 MG: 2 INJECTION INTRAMUSCULAR; INTRAVENOUS at 17:03

## 2021-01-01 RX ADMIN — LORAZEPAM 1 MG: 2 INJECTION INTRAMUSCULAR; INTRAVENOUS at 17:54

## 2021-01-01 RX ADMIN — ASPIRIN 300 MG: 300 SUPPOSITORY RECTAL at 09:23

## 2021-01-01 RX ADMIN — GABAPENTIN 100 MG: 100 CAPSULE ORAL at 09:47

## 2021-01-01 RX ADMIN — METOPROLOL TARTRATE 5 MG: 1 INJECTION, SOLUTION INTRAVENOUS at 18:31

## 2021-01-01 RX ADMIN — PIPERACILLIN AND TAZOBACTAM 3.38 G: 3; .375 INJECTION, POWDER, LYOPHILIZED, FOR SOLUTION INTRAVENOUS at 00:09

## 2021-01-01 RX ADMIN — METOPROLOL TARTRATE 5 MG: 1 INJECTION, SOLUTION INTRAVENOUS at 06:23

## 2021-01-01 RX ADMIN — ENOXAPARIN SODIUM 40 MG: 40 INJECTION SUBCUTANEOUS at 11:33

## 2021-01-01 RX ADMIN — METOPROLOL TARTRATE 50 MG: 50 TABLET, FILM COATED ORAL at 17:23

## 2021-01-01 RX ADMIN — SODIUM CHLORIDE 15 MG/HR: 900 INJECTION, SOLUTION INTRAVENOUS at 05:45

## 2021-01-01 RX ADMIN — CLOPIDOGREL BISULFATE 75 MG: 75 TABLET ORAL at 09:47

## 2021-01-01 RX ADMIN — GLYCOPYRROLATE 0.2 MG: 0.2 INJECTION INTRAMUSCULAR; INTRAVENOUS at 17:44

## 2021-01-01 RX ADMIN — MORPHINE SULFATE 2 MG: 2 INJECTION, SOLUTION INTRAMUSCULAR; INTRAVENOUS at 14:43

## 2021-01-01 RX ADMIN — MANNITOL 21.1 G: 20 INJECTION, SOLUTION INTRAVENOUS at 01:43

## 2021-01-01 RX ADMIN — INSULIN LISPRO 2 UNITS: 100 INJECTION, SOLUTION INTRAVENOUS; SUBCUTANEOUS at 17:09

## 2021-01-01 RX ADMIN — LISINOPRIL 20 MG: 20 TABLET ORAL at 09:29

## 2021-01-01 RX ADMIN — TAMSULOSIN HYDROCHLORIDE 0.4 MG: 0.4 CAPSULE ORAL at 09:48

## 2021-01-01 RX ADMIN — PIPERACILLIN AND TAZOBACTAM 3.38 G: 3; .375 INJECTION, POWDER, LYOPHILIZED, FOR SOLUTION INTRAVENOUS at 09:49

## 2021-01-01 RX ADMIN — ENOXAPARIN SODIUM 40 MG: 40 INJECTION SUBCUTANEOUS at 09:34

## 2021-01-01 RX ADMIN — HYDRALAZINE HYDROCHLORIDE 10 MG: 20 INJECTION INTRAMUSCULAR; INTRAVENOUS at 15:48

## 2021-01-01 RX ADMIN — PIPERACILLIN AND TAZOBACTAM 3.38 G: 3; .375 INJECTION, POWDER, LYOPHILIZED, FOR SOLUTION INTRAVENOUS at 16:00

## 2021-01-01 RX ADMIN — SODIUM CHLORIDE 75 ML/HR: 9 INJECTION, SOLUTION INTRAVENOUS at 11:25

## 2021-01-01 RX ADMIN — GABAPENTIN 100 MG: 100 CAPSULE ORAL at 22:09

## 2021-01-01 RX ADMIN — MORPHINE SULFATE 2 MG: 2 INJECTION, SOLUTION INTRAMUSCULAR; INTRAVENOUS at 13:43

## 2021-01-01 RX ADMIN — METOPROLOL TARTRATE 5 MG: 1 INJECTION, SOLUTION INTRAVENOUS at 06:58

## 2021-01-01 RX ADMIN — MORPHINE SULFATE 2 MG: 2 INJECTION, SOLUTION INTRAMUSCULAR; INTRAVENOUS at 16:48

## 2021-01-01 RX ADMIN — SODIUM CHLORIDE 50 ML/HR: 9 INJECTION, SOLUTION INTRAVENOUS at 18:28

## 2021-01-01 RX ADMIN — METOPROLOL TARTRATE 5 MG: 1 INJECTION, SOLUTION INTRAVENOUS at 13:34

## 2021-01-01 RX ADMIN — ACETAMINOPHEN 650 MG: 650 SUPPOSITORY RECTAL at 08:57

## 2021-01-01 RX ADMIN — MORPHINE SULFATE 2 MG: 2 INJECTION, SOLUTION INTRAMUSCULAR; INTRAVENOUS at 14:01

## 2021-01-01 RX ADMIN — INSULIN LISPRO 2 UNITS: 100 INJECTION, SOLUTION INTRAVENOUS; SUBCUTANEOUS at 21:47

## 2021-01-01 RX ADMIN — HYDRALAZINE HYDROCHLORIDE 10 MG: 20 INJECTION INTRAMUSCULAR; INTRAVENOUS at 09:20

## 2021-01-01 RX ADMIN — MECLIZINE 25 MG: 12.5 TABLET ORAL at 14:04

## 2021-01-01 RX ADMIN — LORAZEPAM 1 MG: 2 INJECTION INTRAMUSCULAR; INTRAVENOUS at 10:30

## 2021-01-01 RX ADMIN — INSULIN LISPRO 3 UNITS: 100 INJECTION, SOLUTION INTRAVENOUS; SUBCUTANEOUS at 17:38

## 2021-01-01 RX ADMIN — GLYCOPYRROLATE 0.2 MG: 0.2 INJECTION INTRAMUSCULAR; INTRAVENOUS at 13:47

## 2021-01-01 RX ADMIN — ATORVASTATIN CALCIUM 40 MG: 40 TABLET, FILM COATED ORAL at 09:47

## 2021-01-01 RX ADMIN — PIPERACILLIN AND TAZOBACTAM 3.38 G: 3; .375 INJECTION, POWDER, LYOPHILIZED, FOR SOLUTION INTRAVENOUS at 01:20

## 2021-01-01 RX ADMIN — MORPHINE SULFATE 2 MG: 2 INJECTION, SOLUTION INTRAMUSCULAR; INTRAVENOUS at 13:15

## 2021-01-01 RX ADMIN — LORAZEPAM 1 MG: 2 INJECTION INTRAMUSCULAR; INTRAVENOUS at 15:31

## 2021-01-01 RX ADMIN — SODIUM CHLORIDE 5 MG/HR: 900 INJECTION, SOLUTION INTRAVENOUS at 13:20

## 2021-01-01 RX ADMIN — ONDANSETRON 4 MG: 2 INJECTION INTRAMUSCULAR; INTRAVENOUS at 14:03

## 2021-01-01 RX ADMIN — ASPIRIN 81 MG: 81 TABLET, COATED ORAL at 09:29

## 2021-01-01 RX ADMIN — HYDRALAZINE HYDROCHLORIDE 10 MG: 20 INJECTION INTRAMUSCULAR; INTRAVENOUS at 02:25

## 2021-01-01 RX ADMIN — SODIUM CHLORIDE 15 MG/HR: 900 INJECTION, SOLUTION INTRAVENOUS at 20:09

## 2021-01-01 RX ADMIN — LORAZEPAM 1 MG: 2 INJECTION INTRAMUSCULAR; INTRAVENOUS at 14:30

## 2021-01-01 RX ADMIN — LORAZEPAM 1 MG: 2 INJECTION INTRAMUSCULAR; INTRAVENOUS at 14:55

## 2021-01-01 RX ADMIN — HYDRALAZINE HYDROCHLORIDE 10 MG: 20 INJECTION INTRAMUSCULAR; INTRAVENOUS at 09:39

## 2021-01-01 RX ADMIN — ASPIRIN 300 MG: 300 SUPPOSITORY RECTAL at 15:51

## 2021-01-01 RX ADMIN — SODIUM CHLORIDE 15 MG/HR: 900 INJECTION, SOLUTION INTRAVENOUS at 01:00

## 2021-01-01 RX ADMIN — MORPHINE SULFATE 2 MG: 2 INJECTION, SOLUTION INTRAMUSCULAR; INTRAVENOUS at 15:33

## 2021-01-01 RX ADMIN — INSULIN LISPRO 3 UNITS: 100 INJECTION, SOLUTION INTRAVENOUS; SUBCUTANEOUS at 18:25

## 2021-01-01 RX ADMIN — LISINOPRIL 20 MG: 20 TABLET ORAL at 09:48

## 2021-01-01 RX ADMIN — GABAPENTIN 100 MG: 100 CAPSULE ORAL at 09:29

## 2021-01-01 RX ADMIN — MORPHINE SULFATE 2 MG: 2 INJECTION, SOLUTION INTRAMUSCULAR; INTRAVENOUS at 14:30

## 2021-01-01 RX ADMIN — INSULIN LISPRO 2 UNITS: 100 INJECTION, SOLUTION INTRAVENOUS; SUBCUTANEOUS at 07:30

## 2021-01-01 RX ADMIN — LORAZEPAM 1 MG: 2 INJECTION INTRAMUSCULAR; INTRAVENOUS at 18:00

## 2021-01-01 RX ADMIN — MANNITOL 21.1 G: 20 INJECTION, SOLUTION INTRAVENOUS at 02:30

## 2021-01-01 RX ADMIN — LORAZEPAM 1 MG: 2 INJECTION INTRAMUSCULAR; INTRAVENOUS at 15:33

## 2021-01-01 RX ADMIN — MANNITOL 75 G: 20 INJECTION, SOLUTION INTRAVENOUS at 18:28

## 2021-01-01 RX ADMIN — SODIUM CHLORIDE 15 MG/HR: 900 INJECTION, SOLUTION INTRAVENOUS at 10:16

## 2021-01-01 RX ADMIN — MORPHINE SULFATE 2 MG: 2 INJECTION, SOLUTION INTRAMUSCULAR; INTRAVENOUS at 18:13

## 2021-01-01 RX ADMIN — ATORVASTATIN CALCIUM 40 MG: 40 TABLET, FILM COATED ORAL at 09:29

## 2021-01-01 RX ADMIN — INSULIN LISPRO 2 UNITS: 100 INJECTION, SOLUTION INTRAVENOUS; SUBCUTANEOUS at 12:42

## 2021-03-21 NOTE — ED NOTES
Updated PTs sister, Lewis Reveal. PTs sister stated that PT has a hx of COPD, HTN, Depression. Pts sister stated that has hx of a stroke from many years ago and has a stent in place. Pts sister states that PT is non-compliant with medications at home. He has them but chooses not to take them.

## 2021-03-21 NOTE — ED NOTES
Ulises Gore reviewed discharge instructions with the patient. The patient verbalized understanding. All questions and concerns were addressed. The patient departed by a wheelchair and discharged ambulatory in the care of family members with instructions and prescriptions in hand. Pt is alert and oriented x 4. Respirations are clear and unlabored.

## 2021-03-21 NOTE — ED PROVIDER NOTES
EMERGENCY DEPARTMENT HISTORY AND PHYSICAL EXAM 
 
 
Date: 3/21/2021 Patient Name: Jennifer Garcia 
 
History of Presenting Illness Chief Complaint Patient presents with  Fall  
  pt arrives to ER via EMS coming from home. pt had GLF around 8 a,m and 10 a.m., pt c/o pain to L arm, and generalized weakness to lower extremities. pt denies hitting head or LOC, falls d/t weakness  Extremity Weakness  
  weakness getting worse at home, falling out of chairs and bed History Provided By: Patient HPI: Jennifer Garcia, 77 y.o. male presents to the ED with cc of falls and weakness. Patient states that he fell twice this morning. He did not lose consciousness but had a difficult time getting up after the last fall. He had generalized weakness in the lower extremities at the time of his fall. He also had a spinning sensation which he has never had before. He denies tinnitus, headache, shortness of breath, diarrhea, vomiting, fever or chills. He denied any any injuries after his falls. He just developed chest pain while he was in the emergency department. He states it is a 10 out of 10 in his lower sternal region. There is no radiation to the neck, back, jaw or arms. It is not associated with nausea or diaphoresis. He also denies cough. There are no other complaints, changes, or physical findings at this time. PCP: Coy Mcdaniel MD 
 
No current facility-administered medications on file prior to encounter. Current Outpatient Medications on File Prior to Encounter Medication Sig Dispense Refill  acetaminophen (TYLENOL) 500 mg tablet Take 500 mg by mouth every six (6) hours as needed for Pain.  atorvastatin (LIPITOR) 80 mg tablet Take 80 mg by mouth daily.  clopidogrel (PLAVIX) 75 mg tablet Take 75 mg by mouth daily.  docusate sodium (COLACE) 100 mg capsule Take 100 mg by mouth two (2) times a day.     
 omega-3 fatty acids-vitamin e (FISH OIL) 1,000 mg cap Take 1 Cap by mouth.  gabapentin (NEURONTIN) 100 mg capsule Take 100 mg by mouth three (3) times daily.  isosorbide dinitrate (ISORDIL) 30 mg tablet Take 30 mg by mouth four (4) times daily.  LANCETS & BLOOD GLUCOSE STRIPS by Does Not Apply route.  lisinopril (PRINIVIL, ZESTRIL) 20 mg tablet Take 20 mg by mouth daily.  metoprolol (LOPRESSOR) 50 mg tablet Take 50 mg by mouth two (2) times a day.  mirtazapine (REMERON) 15 mg tablet Take 15 mg by mouth nightly.  nitroglycerin (NITROSTAT) 0.4 mg SL tablet by SubLINGual route every five (5) minutes as needed for Chest Pain.  pantoprazole (PROTONIX) 40 mg tablet Take 40 mg by mouth daily.  Dutasteride-Tamsulosin 0.5-0.4 mg CM24 Take 0.4 mg by mouth.  ergocalciferol (VITAMIN D2) 50,000 unit capsule Take 1 Cap by mouth every fourteen (14) days. 2 Cap 1  
 lisinopril (PRINIVIL, ZESTRIL) 5 mg tablet Take 2 Tabs by mouth daily. 30 Tab 1  
 acetaminophen (ACETAMINOPHEN EXTRA STRENGTH) 500 mg tablet Take 500 mg by mouth every six (6) hours as needed for Pain. Indications: PAIN    
 sertraline (ZOLOFT) 50 mg tablet Take 75 mg by mouth daily.  cyanocobalamin (VITAMIN B-12) 1,000 mcg tablet Take 2,000 mcg by mouth daily.  tamsulosin (FLOMAX) 0.4 mg capsule Take 0.4 mg by mouth daily.  ticagrelor (BRILINTA) 90 mg tablet Take 1 Tab by mouth two (2) times a day. 60 Tab 11  
 atorvastatin (LIPITOR) 40 mg tablet Take 40 mg by mouth daily.  pantoprazole (PROTONIX) 40 mg tablet Take 40 mg by mouth two (2) times a day. Indications: GASTROESOPHAGEAL REFLUX  aspirin delayed-release 81 mg tablet Take 81 mg by mouth daily.  metoprolol (LOPRESSOR) 50 mg tablet Take 50 mg by mouth two (2) times a day. Past History Past Medical History: 
Past Medical History:  
Diagnosis Date  CAD (coronary artery disease)  Cancer [facility])   
 prostate  Diabetes ([facility] [address]  GERD (gastroesophageal reflux disease)  Heart failure (HCC)   
 cardiac stent  Hyperlipemia  Hypertension  Screen for colon cancer 6/11/2015  Stroke ([facility] [address]  Stroke ([facility] [address] 2000 Past Surgical History: 
Past Surgical History:  
Procedure Laterality Date  LA CARDIAC SURG PROCEDURE UNLIST    
 stent Family History: No family history on file. Social History: 
Social History Tobacco Use  Smoking status: Never Smoker  Smokeless tobacco: Never Used Substance Use Topics  Alcohol use: No  
 Drug use: Not on file Allergies: 
No Known Allergies Review of Systems Review of Systems Constitutional: Negative for fever. HENT: Negative for congestion. Eyes: Negative. Respiratory: Negative for shortness of breath. Cardiovascular: Positive for chest pain. Gastrointestinal: Negative for abdominal pain. Endocrine: Negative for heat intolerance. Genitourinary: Negative. Musculoskeletal: Negative for back pain. Skin: Negative for rash. Allergic/Immunologic: Negative for immunocompromised state. Neurological: Positive for dizziness. Hematological: Does not bruise/bleed easily. Psychiatric/Behavioral: Negative. All other systems reviewed and are negative. Physical Exam  
Physical Exam 
Vitals signs and nursing note reviewed. Constitutional:   
   General: He is not in acute distress. Appearance: He is well-developed. HENT:  
   Head: Normocephalic and atraumatic. Neck: Musculoskeletal: Normal range of motion. Cardiovascular:  
   Rate and Rhythm: Normal rate and regular rhythm. Pulses: Normal pulses. Heart sounds: Normal heart sounds. Pulmonary:  
   Effort: Pulmonary effort is normal.  
   Breath sounds: Normal breath sounds. Chest:  
   Chest wall: Tenderness present. Abdominal:  
   General: Bowel sounds are normal.  
   Palpations: Abdomen is soft. Musculoskeletal: Normal range of motion.   
Skin: 
   General: Skin is warm and dry. Neurological:  
   General: No focal deficit present. Mental Status: He is alert and oriented to person, place, and time. Coordination: Coordination normal.  
Psychiatric:     
   Mood and Affect: Mood normal.     
   Behavior: Behavior normal.  
 
 
 
Diagnostic Study Results Labs - Recent Results (from the past 12 hour(s)) CBC WITH AUTOMATED DIFF Collection Time: 03/21/21 11:30 AM  
Result Value Ref Range WBC 7.2 4.1 - 11.1 K/uL  
 RBC 5.82 (H) 4.10 - 5.70 M/uL  
 HGB 16.4 12.1 - 17.0 g/dL HCT 46.3 36.6 - 50.3 % MCV 79.6 (L) 80.0 - 99.0 FL  
 MCH 28.2 26.0 - 34.0 PG  
 MCHC 35.4 30.0 - 36.5 g/dL  
 RDW 14.7 (H) 11.5 - 14.5 % PLATELET 616 251 - 849 K/uL MPV 9.6 8.9 - 12.9 FL  
 NRBC 0.0 0  WBC ABSOLUTE NRBC 0.00 0.00 - 0.01 K/uL NEUTROPHILS 91 (H) 32 - 75 % LYMPHOCYTES 4 (L) 12 - 49 % MONOCYTES 5 5 - 13 % EOSINOPHILS 0 0 - 7 % BASOPHILS 0 0 - 1 % IMMATURE GRANULOCYTES 0 0.0 - 0.5 % ABS. NEUTROPHILS 6.5 1.8 - 8.0 K/UL  
 ABS. LYMPHOCYTES 0.3 (L) 0.8 - 3.5 K/UL  
 ABS. MONOCYTES 0.4 0.0 - 1.0 K/UL  
 ABS. EOSINOPHILS 0.0 0.0 - 0.4 K/UL  
 ABS. BASOPHILS 0.0 0.0 - 0.1 K/UL  
 ABS. IMM. GRANS. 0.0 0.00 - 0.04 K/UL  
 DF SMEAR SCANNED    
 RBC COMMENTS NORMOCYTIC, NORMOCHROMIC METABOLIC PANEL, COMPREHENSIVE Collection Time: 03/21/21 11:30 AM  
Result Value Ref Range Sodium 137 136 - 145 mmol/L Potassium 4.0 3.5 - 5.1 mmol/L Chloride 105 97 - 108 mmol/L  
 CO2 27 21 - 32 mmol/L Anion gap 5 5 - 15 mmol/L Glucose 163 (H) 65 - 100 mg/dL BUN 14 6 - 20 MG/DL Creatinine 1.20 0.70 - 1.30 MG/DL  
 BUN/Creatinine ratio 12 12 - 20 GFR est AA >60 >60 ml/min/1.73m2 GFR est non-AA >60 >60 ml/min/1.73m2 Calcium 8.8 8.5 - 10.1 MG/DL Bilirubin, total 0.4 0.2 - 1.0 MG/DL  
 ALT (SGPT) 14 12 - 78 U/L  
 AST (SGOT) 20 15 - 37 U/L Alk. phosphatase 130 (H) 45 - 117 U/L Protein, total 7.9 6.4 - 8.2 g/dL  Albumin 3.7 3.5 - 5.0 g/dL Globulin 4.2 (H) 2.0 - 4.0 g/dL A-G Ratio 0.9 (L) 1.1 - 2.2 MAGNESIUM Collection Time: 03/21/21 11:30 AM  
Result Value Ref Range Magnesium 2.0 1.6 - 2.4 mg/dL TROPONIN I Collection Time: 03/21/21 11:30 AM  
Result Value Ref Range Troponin-I, Qt. 0.05 (H) <0.05 ng/mL TROPONIN I Collection Time: 03/21/21  2:11 PM  
Result Value Ref Range Troponin-I, Qt. 0.10 (H) <0.05 ng/mL CK Collection Time: 03/21/21  2:11 PM  
Result Value Ref Range CK 1,117 (H) 39 - 308 U/L  
URINALYSIS W/ REFLEX CULTURE Collection Time: 03/21/21  3:15 PM  
 Specimen: Urine Result Value Ref Range Color YELLOW/STRAW Appearance CLEAR CLEAR Specific gravity 1.019 1.003 - 1.030    
 pH (UA) 7.5 5.0 - 8.0 Protein TRACE (A) NEG mg/dL Glucose Negative NEG mg/dL Ketone 15 (A) NEG mg/dL Bilirubin Negative NEG Blood TRACE (A) NEG Urobilinogen 2.0 (H) 0.2 - 1.0 EU/dL Nitrites Negative NEG Leukocyte Esterase Negative NEG    
 WBC 0-4 0 - 4 /hpf  
 RBC 10-20 0 - 5 /hpf Epithelial cells FEW FEW /lpf Bacteria Negative NEG /hpf  
 UA:UC IF INDICATED CULTURE NOT INDICATED BY UA RESULT CNI Hyaline cast 0-2 0 - 5 /lpf EKG, 12 LEAD, INITIAL Collection Time: 03/21/21  3:15 PM  
Result Value Ref Range Ventricular Rate 82 BPM  
 Atrial Rate 82 BPM  
 P-R Interval 140 ms QRS Duration 80 ms  
 Q-T Interval 378 ms QTC Calculation (Bezet) 441 ms Calculated P Axis 56 degrees Calculated R Axis 35 degrees Calculated T Axis 94 degrees Diagnosis Normal sinus rhythm Minimal voltage criteria for LVH, may be normal variant Cannot rule out Inferior infarct , age undetermined When compared with ECG of 11-JUN-2015 08:03, 
Minimal criteria for Inferior infarct are now present TROPONIN I Collection Time: 03/21/21  3:46 PM  
Result Value Ref Range Troponin-I, Qt. 0.12 (H) <0.05 ng/mL Radiologic Studies -  
XR CHEST PORT  
Final Result No acute abnormality CT HEAD WO CONT Final Result Sinus disease. No acute findings. 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 CT Results  (Last 48 hours) 03/21/21 1338  CT HEAD WO CONT Final result Impression:  Sinus disease. No acute findings. 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 Narrative:  EXAM: CT HEAD WO CONT INDICATION: Dizziness COMPARISON: None. CONTRAST: None. TECHNIQUE: Unenhanced CT of the head was performed using 5 mm images. Brain and  
bone windows were generated. Coronal and sagittal reformats. CT dose reduction  
was achieved through use of a standardized protocol tailored for this  
examination and automatic exposure control for dose modulation. FINDINGS:  
The ventricles and sulci are enlarged. There is periventricular hypoattenuation. There is no intracranial hemorrhage, extra-axial collection, or mass effect. The  
basilar cisterns are open. Chronic left posterior parietal infarct. The bone windows demonstrate no abnormalities. There is mucosal thickening of  
the right ethmoid sinuses. CXR Results  (Last 48 hours) 03/21/21 1428  XR CHEST PORT Final result Impression:  No acute abnormality Narrative:  EXAM:  XR CHEST PORT INDICATION:  pain COMPARISON:  None. FINDINGS: A portable AP radiograph of the chest was obtained at 1412 hours. .   
The lungs are clear. The cardiac and mediastinal contours and pulmonary  
vascularity are normal.  The bones and soft tissues are grossly within normal  
limits. Medical Decision Making I am the first provider for this patient. I reviewed the vital signs, available nursing notes, past medical history, past surgical history, family history and social history. Vital Signs-Reviewed the patient's vital signs.  
Patient Vitals for the past 12 hrs: 
 Temp Pulse Resp BP SpO2  
03/21/21 1115 99 °F (37.2 °C) (!) 104 18 (!) 187/93 100 % EKG interpretation: (Preliminary) Rhythm: normal sinus rhythm; and regular . Rate (approx.): 82; Axis: normal; OK interval: normal; QRS interval: normal ; ST/T wave: normal; Other findings: Unchanged from previous EKG. Records Reviewed: Nursing Notes, Old Medical Records, Previous electrocardiograms, Previous Radiology Studies and Previous Laboratory Studies Provider Notes (Medical Decision Making):  
Vertigo, CVA, electrolyte abnormality, arrhythmia, anemia, dehydration, costochondritis, CAD 
 
ED Course:  
Initial assessment performed. The patients presenting problems have been discussed, and they are in agreement with the care plan formulated and outlined with them. I have encouraged them to ask questions as they arise throughout their visit. Progress note: 
 
Patient's chest pain is resolved with Robaxin. Patient is feeling better. Consult note: 
 
 
Spoke to Adelaida Bruce, nurse practitioner, cardiology. She recommends a third troponin and if there is no significant change, the patient can follow-up with them for outpatient stress test.  If the troponin continues to elevate, patient can be admitted to the hospitalist and made n.p.o. after midnight. progress note: 
 
 
Patient is feeling better. His results were reviewed. He is advised to follow-up and return to ER if worse Critical Care Time:  
0 Disposition: 
home DISCHARGE PLAN: 
1. Discharge Medication List as of 3/21/2021  4:46 PM  
  
START taking these medications Details  
meclizine (ANTIVERT) 25 mg tablet Take 1 Tab by mouth three (3) times daily as needed for Dizziness., Normal, Disp-20 Tab, R-0  
  
methocarbamoL (Robaxin-750) 750 mg tablet Take 1 Tab by mouth four (4) times daily as needed for Muscle Spasm(s). , Normal, Disp-20 Tab, R-0  
  
  
CONTINUE these medications which have NOT CHANGED  Details  
!! acetaminophen (TYLENOL) 500 mg tablet Take 500 mg by mouth every six (6) hours as needed for Pain., Historical Med  
  
!! atorvastatin (LIPITOR) 80 mg tablet Take 80 mg by mouth daily. , Historical Med  
  
clopidogrel (PLAVIX) 75 mg tablet Take 75 mg by mouth daily. , Historical Med  
  
docusate sodium (COLACE) 100 mg capsule Take 100 mg by mouth two (2) times a day., Historical Med  
  
omega-3 fatty acids-vitamin e (FISH OIL) 1,000 mg cap Take 1 Cap by mouth., Historical Med  
  
gabapentin (NEURONTIN) 100 mg capsule Take 100 mg by mouth three (3) times daily. , Historical Med  
  
isosorbide dinitrate (ISORDIL) 30 mg tablet Take 30 mg by mouth four (4) times daily. , Historical Med LANCETS & BLOOD GLUCOSE STRIPS by Does Not Apply route., Historical Med  
  
!! lisinopril (PRINIVIL, ZESTRIL) 20 mg tablet Take 20 mg by mouth daily. , Historical Med  
  
!! metoprolol (LOPRESSOR) 50 mg tablet Take 50 mg by mouth two (2) times a day., Historical Med  
  
mirtazapine (REMERON) 15 mg tablet Take 15 mg by mouth nightly., Historical Med  
  
nitroglycerin (NITROSTAT) 0.4 mg SL tablet by SubLINGual route every five (5) minutes as needed for Chest Pain., Historical Med  
  
!! pantoprazole (PROTONIX) 40 mg tablet Take 40 mg by mouth daily. , Historical Med Dutasteride-Tamsulosin 0.5-0.4 mg CM24 Take 0.4 mg by mouth., Historical Med  
  
ergocalciferol (VITAMIN D2) 50,000 unit capsule Take 1 Cap by mouth every fourteen (14) days. , Print, Disp-2 Cap, R-1  
  
!! lisinopril (PRINIVIL, ZESTRIL) 5 mg tablet Take 2 Tabs by mouth daily. , Print, Disp-30 Tab, R-1  
  
!! acetaminophen (ACETAMINOPHEN EXTRA STRENGTH) 500 mg tablet Take 500 mg by mouth every six (6) hours as needed for Pain. Indications: PAIN, Historical Med  
  
sertraline (ZOLOFT) 50 mg tablet Take 75 mg by mouth daily. , Historical Med  
  
cyanocobalamin (VITAMIN B-12) 1,000 mcg tablet Take 2,000 mcg by mouth daily. , Historical Med  
  
tamsulosin (FLOMAX) 0.4 mg capsule Take 0.4 mg by mouth daily. , Historical Med  
  
ticagrelor (BRILINTA) 90 mg tablet Take 1 Tab by mouth two (2) times a day., Print, Disp-60 Tab, R-11  
  
!! atorvastatin (LIPITOR) 40 mg tablet Take 40 mg by mouth daily. , Historical Med  
  
!! pantoprazole (PROTONIX) 40 mg tablet Take 40 mg by mouth two (2) times a day. Indications: GASTROESOPHAGEAL REFLUX, Historical Med  
  
aspirin delayed-release 81 mg tablet Take 81 mg by mouth daily. , Historical Med  
  
!! metoprolol (LOPRESSOR) 50 mg tablet Take 50 mg by mouth two (2) times a day., Historical Med  
  
 !! - Potential duplicate medications found. Please discuss with provider. 2.  
Follow-up Information Follow up With Specialties Details Why Contact Info Briseida Flores MD Internal Medicine, Family Medicine  As needed [address] [facility]. [address] 
[phone] Korin Isidro MD Cardiology, [address] [facility] Vascular Surgery, Internal Medicine Call in 1 day  [address] 
[phone] [facility] EMERGENCY DEPT Emergency Medicine  If symptoms worsen [address] [facility] [address] [facility] 
[phone] 3. Return to ED if worse Diagnosis Clinical Impression: 1. Vertigo 2. Traumatic rhabdomyolysis, initial encounter ([facility] [address] 3. Troponin level elevated 4. Chest pain in adult Attestations: 
 
Maria L Haro MD 
 
Please note that this dictation was completed with CHORD, the computer voice recognition software. Quite often unanticipated grammatical, syntax, homophones, and other interpretive errors are inadvertently transcribed by the computer software. Please disregard these errors. Please excuse any errors that have escaped final proofreading. Thank you.

## 2021-10-07 PROBLEM — I63.9 STROKE (CEREBRUM) (HCC): Status: ACTIVE | Noted: 2021-01-01

## 2021-10-07 NOTE — PROGRESS NOTES
Problem: Falls - Risk of  Goal: *Absence of Falls  Description: Document Balbina Galvin Fall Risk and appropriate interventions in the flowsheet. Outcome: Progressing Towards Goal  Note: Fall Risk Interventions:       Mentation Interventions: Room close to nurse's station, Toileting rounds, Update white board, More frequent rounding         Elimination Interventions: Toilet paper/wipes in reach, Toileting schedule/hourly rounds, Urinal in reach, Stay With Me (per policy)              Problem: Patient Education: Go to Patient Education Activity  Goal: Patient/Family Education  Outcome: Progressing Towards Goal     Problem: TIA/CVA Stroke: 0-24 hours  Goal: Off Pathway (Use only if patient is Off Pathway)  Outcome: Progressing Towards Goal   Pt disoriented x3, calm but, GREGORIO orientation, unable to do NIH,  resting in bed at this time. Spoke with pt's sister Nery Cuenca who updated on PTA meds and other questions.   1905 End of shift report off to [facility]

## 2021-10-07 NOTE — H&P
Hospitalist Admission Note    NAME: Choco Ng   :  1955   MRN:  [id number]     Date/Time:  10/7/2021 8:03 AM    Patient PCP: Sydney Prasad MD  ______________________________________________________________________  Given the patient's current clinical presentation, I have a high level of concern for decompensation if discharged from the emergency department. Complex decision making was performed, which includes reviewing the patient's available past medical records, laboratory results, and x-ray films. My assessment of this patient's clinical condition and my plan of care is as follows. Assessment / Plan:  Stroke  Admit to neurology floor  Stroke order set used  MRI Brain  Echo  A1c/Lipid Panel  PT/OT/CM  Continue with ASA and Plavix  Pharmacy for med rec - pt with multiple prescriptions of different doses for the same medication. Pt also with rx for Brilinta. Adjust ordered meds as per med rec. IV Hydralazine PRN    Head CT: IMPRESSION   Right frontal lobe infarct is late acute versus subacute, new since March. No intracranial hemorrhage. 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 The left  vertebral artery is dominant with a right vertebral artery terminates in PICA  there is retrograde filling of the basilar artery via posterior communicating  artery on the right. Central pontine hypodensity is concerning for acute infarction. Moderate to severe stenosis proximal M2 anterior division segment with small 3.5  x 2 mm aneurysm associated with this region. Right frontal infarction is subacute. Moderate chronic microvascular ischemic change. Mild cervical atherosclerotic vasculopathy. Mild to moderate stenosis of the supraclinoid ICA on the right.      CAD  HTN  HLD  Hx of CVA in   BPH  Continue home meds      Code Status: Full  Surrogate Decision Maker: Sister  DVT Prophylaxis: Lovenox  GI Prophylaxis: not indicated  Baseline: Independent      Subjective:   CHIEF COMPLAINT: lethargy, talking funny    HISTORY OF PRESENT ILLNESS:     Prabha Addison is a 77 y.o.  AA male who presents with CC listed above. Pt's family is unaware of pt's last known well time. Pt denies any complaints at this time but his family state that his mental status is not at baseline. EMS was called because of AMS. Pt denies any history of fever, chills, chest pain or syncope. We were asked to admit for work up and evaluation of the above problems. Past Medical History:   Diagnosis Date    CAD (coronary artery disease)     Cancer ([facility] [address]     prostate    Diabetes ([facility] [address]     GERD (gastroesophageal reflux disease)     Heart failure (HCC)     cardiac stent    Hyperlipemia     Hypertension     Screen for colon cancer 6/11/2015    Stroke ([facility] [address]     Stroke ([facility] [address]     2000        Past Surgical History:   Procedure Laterality Date    DC CARDIAC SURG PROCEDURE UNLIST      stent       Social History     Tobacco Use    Smoking status: Never Smoker    Smokeless tobacco: Never Used   Substance Use Topics    Alcohol use: No        History reviewed. No pertinent family history. No Known Allergies     Prior to Admission medications    Medication Sig Start Date End Date Taking? Authorizing Provider   meclizine (ANTIVERT) 25 mg tablet Take 1 Tab by mouth three (3) times daily as needed for Dizziness. 3/21/21   Felicia Duran MD   methocarbamoL (Robaxin-750) 750 mg tablet Take 1 Tab by mouth four (4) times daily as needed for Muscle Spasm(s). 3/21/21   Felicia Duran MD   acetaminophen (TYLENOL) 500 mg tablet Take 500 mg by mouth every six (6) hours as needed for Pain. Provider, Historical   atorvastatin (LIPITOR) 80 mg tablet Take 80 mg by mouth daily. Provider, Historical   clopidogrel (PLAVIX) 75 mg tablet Take 75 mg by mouth daily.     Provider, Historical   docusate sodium (COLACE) 100 mg capsule Take 100 mg by mouth two (2) times a day. Provider, Historical   omega-3 fatty acids-vitamin e (FISH OIL) 1,000 mg cap Take 1 Cap by mouth. Provider, Historical   gabapentin (NEURONTIN) 100 mg capsule Take 100 mg by mouth three (3) times daily. Provider, Historical   isosorbide dinitrate (ISORDIL) 30 mg tablet Take 30 mg by mouth four (4) times daily. Provider, Historical   LANCETS & BLOOD GLUCOSE STRIPS by Does Not Apply route. Provider, Historical   lisinopril (PRINIVIL, ZESTRIL) 20 mg tablet Take 20 mg by mouth daily. Provider, Historical   metoprolol (LOPRESSOR) 50 mg tablet Take 50 mg by mouth two (2) times a day. Provider, Historical   mirtazapine (REMERON) 15 mg tablet Take 15 mg by mouth nightly. Provider, Historical   nitroglycerin (NITROSTAT) 0.4 mg SL tablet by SubLINGual route every five (5) minutes as needed for Chest Pain. Provider, Historical   pantoprazole (PROTONIX) 40 mg tablet Take 40 mg by mouth daily. Provider, Historical   Dutasteride-Tamsulosin 0.5-0.4 mg CM24 Take 0.4 mg by mouth. Provider, Historical   ergocalciferol (VITAMIN D2) 50,000 unit capsule Take 1 Cap by mouth every fourteen (14) days. 5/1/14   Rishabh Maria MD   lisinopril (PRINIVIL, ZESTRIL) 5 mg tablet Take 2 Tabs by mouth daily. 5/1/14   Rishabh Maria MD   acetaminophen (ACETAMINOPHEN EXTRA STRENGTH) 500 mg tablet Take 500 mg by mouth every six (6) hours as needed for Pain. Indications: PAIN    Provider, Historical   sertraline (ZOLOFT) 50 mg tablet Take 75 mg by mouth daily. Other, MD Elton   cyanocobalamin (VITAMIN B-12) 1,000 mcg tablet Take 2,000 mcg by mouth daily. Sree, MD Elton   tamsulosin (FLOMAX) 0.4 mg capsule Take 0.4 mg by mouth daily. Elton Balbuena MD   ticagrelor (BRILINTA) 90 mg tablet Take 1 Tab by mouth two (2) times a day. 3/18/14   July Holbrook NP   atorvastatin (LIPITOR) 40 mg tablet Take 40 mg by mouth daily.     Elton Balbuena MD   pantoprazole (PROTONIX) 40 mg tablet Take 40 mg by mouth two (2) times a day. Indications: GASTROESOPHAGEAL REFLUX    Elton Balbuena MD   aspirin delayed-release 81 mg tablet Take 81 mg by mouth daily. Elton Balbuena MD   metoprolol (LOPRESSOR) 50 mg tablet Take 50 mg by mouth two (2) times a day. Elton Balbuena MD       REVIEW OF SYSTEMS:     I am not able to complete the review of systems because:    The patient is intubated and sedated    The patient has altered mental status due to his acute medical problems    The patient has baseline aphasia from prior stroke(s)    The patient has baseline dementia and is not reliable historian    The patient is in acute medical distress and unable to provide information           Total of 12 systems reviewed as follows:       POSITIVE= underlined text  Negative = text not underlined  General:  fever, chills, sweats, generalized weakness, weight loss/gain,      loss of appetite   Eyes:    blurred vision, eye pain, loss of vision, double vision  ENT:    rhinorrhea, pharyngitis   Respiratory:   cough, sputum production, SOB, JANE, wheezing, pleuritic pain   Cardiology:   chest pain, palpitations, orthopnea, PND, edema, syncope   Gastrointestinal:  abdominal pain , N/V, diarrhea, dysphagia, constipation, bleeding   Genitourinary:  frequency, urgency, dysuria, hematuria, incontinence   Muskuloskeletal :  arthralgia, myalgia, back pain  Hematology:  easy bruising, nose or gum bleeding, lymphadenopathy   Dermatological: rash, ulceration, pruritis, color change / jaundice  Endocrine:   hot flashes or polydipsia   Neurological:  headache, dizziness, confusion, focal weakness, paresthesia,     Speech difficulties, memory loss, gait difficulty  Psychological: Feelings of anxiety, depression, agitation    Objective:   VITALS:    Visit Vitals  BP (!) 191/98   Pulse 71   Temp 98.6 °F (37 °C)   Resp 19   Ht 5' 8\" (1.727 m)   Wt 84.4 kg (186 lb)   SpO2 100%   BMI 28.28 kg/m²       PHYSICAL EXAM:    General:    Alert, cooperative  HEENT: Atraumatic, anicteric sclerae, pink conjunctivae  Neck:  Supple, symmetrical  Lungs:   Clear to auscultation bilaterally. No Wheezing or Rhonchi. No rales. Chest wall:  No tenderness  No Accessory muscle use. Heart:   Regular  rhythm,  No  murmur   No edema  Abdomen:   Soft, non-tender. Not distended. Bowel sounds normal  Extremities: LUE contracture  Skin:     Not pale. Not Jaundiced  No rashes   Psych:  Fair insight. Not depressed. Not anxious or agitated. Neurologic: EOMs intact. No facial asymmetry. Speech normal. Chronic left sided contractures. Follows commands. _______________________________________________________________________  Care Plan discussed with:    Comments   Patient x    Family      RN x    Care Manager                    Consultant:      _______________________________________________________________________  Expected  Disposition:   Home with Family    HH/PT/OT/RN x   SNF/LTC    MARY    ________________________________________________________________________  TOTAL TIME:  54 Minutes    Critical Care Provided     Minutes non procedure based      Comments     Reviewed previous records   >50% of visit spent in counseling and coordination of care  Discussion with patient and/or family and questions answered       ________________________________________________________________________  Signed: Mayank Bethea MD    Procedures: see electronic medical records for all procedures/Xrays and details which were not copied into this note but were reviewed prior to creation of Plan.     LAB DATA REVIEWED:    Recent Results (from the past 24 hour(s))   EKG, 12 LEAD, INITIAL    Collection Time: 10/07/21  5:05 AM   Result Value Ref Range    Ventricular Rate 68 BPM    Atrial Rate 68 BPM    P-R Interval 132 ms    QRS Duration 76 ms    Q-T Interval 424 ms    QTC Calculation (Bezet) 450 ms    Calculated P Axis 75 degrees    Calculated R Axis 56 degrees    Calculated T Axis 91 degrees Diagnosis       Normal sinus rhythm  Normal ECG  When compared with ECG of 21-MAR-2021 15:15,  No significant change was found     CBC WITH AUTOMATED DIFF    Collection Time: 10/07/21  5:12 AM   Result Value Ref Range    WBC 7.4 4.1 - 11.1 K/uL    RBC 5.01 4.10 - 5.70 M/uL    HGB 15.7 12.1 - 17.0 g/dL    HCT 43.1 36.6 - 50.3 %    MCV 86.0 80.0 - 99.0 FL    MCH 31.3 26.0 - 34.0 PG    MCHC 36.4 30.0 - 36.5 g/dL    RDW 14.2 11.5 - 14.5 %    PLATELET 789 086 - 397 K/uL    MPV 9.1 8.9 - 12.9 FL    NRBC 0.0 0  WBC    ABSOLUTE NRBC 0.00 0.00 - 0.01 K/uL    NEUTROPHILS 84 (H) 32 - 75 %    LYMPHOCYTES 11 (L) 12 - 49 %    MONOCYTES 5 5 - 13 %    EOSINOPHILS 0 0 - 7 %    BASOPHILS 0 0 - 1 %    IMMATURE GRANULOCYTES 0 0.0 - 0.5 %    ABS. NEUTROPHILS 6.2 1.8 - 8.0 K/UL    ABS. LYMPHOCYTES 0.8 0.8 - 3.5 K/UL    ABS. MONOCYTES 0.4 0.0 - 1.0 K/UL    ABS. EOSINOPHILS 0.0 0.0 - 0.4 K/UL    ABS. BASOPHILS 0.0 0.0 - 0.1 K/UL    ABS. IMM. GRANS. 0.0 0.00 - 0.04 K/UL    DF AUTOMATED     METABOLIC PANEL, COMPREHENSIVE    Collection Time: 10/07/21  5:12 AM   Result Value Ref Range    Sodium 137 136 - 145 mmol/L    Potassium 4.0 3.5 - 5.1 mmol/L    Chloride 105 97 - 108 mmol/L    CO2 28 21 - 32 mmol/L    Anion gap 4 (L) 5 - 15 mmol/L    Glucose 193 (H) 65 - 100 mg/dL    BUN 20 6 - 20 MG/DL    Creatinine 1.20 0.70 - 1.30 MG/DL    BUN/Creatinine ratio 17 12 - 20      GFR est AA >60 >60 ml/min/1.73m2    GFR est non-AA >60 >60 ml/min/1.73m2    Calcium 9.3 8.5 - 10.1 MG/DL    Bilirubin, total 0.8 0.2 - 1.0 MG/DL    ALT (SGPT) 14 12 - 78 U/L    AST (SGOT) 13 (L) 15 - 37 U/L    Alk.  phosphatase 124 (H) 45 - 117 U/L    Protein, total 7.9 6.4 - 8.2 g/dL    Albumin 3.5 3.5 - 5.0 g/dL    Globulin 4.4 (H) 2.0 - 4.0 g/dL    A-G Ratio 0.8 (L) 1.1 - 2.2     MAGNESIUM    Collection Time: 10/07/21  5:12 AM   Result Value Ref Range    Magnesium 2.0 1.6 - 2.4 mg/dL   LIPASE    Collection Time: 10/07/21  5:12 AM   Result Value Ref Range    Lipase 97 73 - 393 U/L   ETHYL ALCOHOL    Collection Time: 10/07/21  5:15 AM   Result Value Ref Range    ALCOHOL(ETHYL),SERUM <17 <40 MG/DL   SALICYLATE    Collection Time: 10/07/21  5:15 AM   Result Value Ref Range    Salicylate level <7.7 (L) 2.8 - 20.0 MG/DL   ACETAMINOPHEN    Collection Time: 10/07/21  5:15 AM   Result Value Ref Range    Acetaminophen level <2 (L) 10 - 30 ug/mL

## 2021-10-07 NOTE — ED PROVIDER NOTES
EMERGENCY DEPARTMENT HISTORY AND PHYSICAL EXAM     ----------------------------------------------------------------------------  Please note that this dictation was completed with [employer], the computer voice recognition software. Quite often unanticipated grammatical, syntax, homophones, and other interpretive errors are inadvertently transcribed by the computer software. Please disregard these errors. Please excuse any errors that have escaped final proofreading  ----------------------------------------------------------------------------      Date: 10/7/2021  Patient Name: Yeni Chau    History of Presenting Illness     Chief Complaint   Patient presents with    Lethargy     Per EMS, pt lives with sister and stated patient had become more weak throughout yesterday into tonight; unknown LKW per sister; pt is poor historian; pt reports no deficits/complaints; hx of stroke; pt ANOx2       History Provided By:  Patient and EMS    HPI: Yeni Chau is a 77 y.o. male, with significant pmhx of previous stroke with left-sided deficits, cholesterol, CAD, hypertension, diabetes, previous heart failure and prostate cancer, who presents via EMS to the ED with c/o altered mental status per family member. Family reported to EMS that she is not sure as to when he was last known normal.  Patient's sister whom he lives with noted to EMS that over the last several days been having progressive increased confusion and was \"talking funny\" earlier today and decided to send him in by EMS for further evaluation. Patient is able to answer questions and has no complaints of pain at time of my evaluation. Patient feels that his speech is at baseline. Patient also specifically denies any associated fevers, chills, CP, SOB, nausea, vomiting, diarrhea, abd pain, changes in BM, urinary sxs, or headache.    Social Hx: denies tobacco  denies EtOH , denies recreational/Illicit Drugs    There are no other complaints, changes, or physical findings at this time. PCP: Isabella Sinclair MD    No Known Allergies    Current Facility-Administered Medications   Medication Dose Route Frequency Provider Last Rate Last Admin    acetaminophen (TYLENOL) tablet 650 mg  650 mg Oral Q6H PRN Klarissa Copeland MD        ondansetron [facility]) injection 4 mg  4 mg IntraVENous Q4H PRN Klarissa Copealnd MD         Current Outpatient Medications   Medication Sig Dispense Refill    meclizine (ANTIVERT) 25 mg tablet Take 1 Tab by mouth three (3) times daily as needed for Dizziness. 20 Tab 0    methocarbamoL (Robaxin-750) 750 mg tablet Take 1 Tab by mouth four (4) times daily as needed for Muscle Spasm(s). 20 Tab 0    acetaminophen (TYLENOL) 500 mg tablet Take 500 mg by mouth every six (6) hours as needed for Pain.  atorvastatin (LIPITOR) 80 mg tablet Take 80 mg by mouth daily.  clopidogrel (PLAVIX) 75 mg tablet Take 75 mg by mouth daily.  docusate sodium (COLACE) 100 mg capsule Take 100 mg by mouth two (2) times a day.  omega-3 fatty acids-vitamin e (FISH OIL) 1,000 mg cap Take 1 Cap by mouth.  gabapentin (NEURONTIN) 100 mg capsule Take 100 mg by mouth three (3) times daily.  isosorbide dinitrate (ISORDIL) 30 mg tablet Take 30 mg by mouth four (4) times daily.  LANCETS & BLOOD GLUCOSE STRIPS by Does Not Apply route.  lisinopril (PRINIVIL, ZESTRIL) 20 mg tablet Take 20 mg by mouth daily.  metoprolol (LOPRESSOR) 50 mg tablet Take 50 mg by mouth two (2) times a day.  mirtazapine (REMERON) 15 mg tablet Take 15 mg by mouth nightly.  nitroglycerin (NITROSTAT) 0.4 mg SL tablet by SubLINGual route every five (5) minutes as needed for Chest Pain.  pantoprazole (PROTONIX) 40 mg tablet Take 40 mg by mouth daily.  Dutasteride-Tamsulosin 0.5-0.4 mg CM24 Take 0.4 mg by mouth.  ergocalciferol (VITAMIN D2) 50,000 unit capsule Take 1 Cap by mouth every fourteen (14) days.  2 Cap 1    lisinopril (PRINIVIL, ZESTRIL) 5 mg tablet Take 2 Tabs by mouth daily. 30 Tab 1    acetaminophen (ACETAMINOPHEN EXTRA STRENGTH) 500 mg tablet Take 500 mg by mouth every six (6) hours as needed for Pain. Indications: PAIN      sertraline (ZOLOFT) 50 mg tablet Take 75 mg by mouth daily.  cyanocobalamin (VITAMIN B-12) 1,000 mcg tablet Take 2,000 mcg by mouth daily.  tamsulosin (FLOMAX) 0.4 mg capsule Take 0.4 mg by mouth daily.  ticagrelor (BRILINTA) 90 mg tablet Take 1 Tab by mouth two (2) times a day. 60 Tab 11    atorvastatin (LIPITOR) 40 mg tablet Take 40 mg by mouth daily.  pantoprazole (PROTONIX) 40 mg tablet Take 40 mg by mouth two (2) times a day. Indications: GASTROESOPHAGEAL REFLUX      aspirin delayed-release 81 mg tablet Take 81 mg by mouth daily.  metoprolol (LOPRESSOR) 50 mg tablet Take 50 mg by mouth two (2) times a day. Past History     Past Medical History:  Past Medical History:   Diagnosis Date    CAD (coronary artery disease)     Cancer ([facility] [address]     prostate    Diabetes ([facility] [facility]     GERD (gastroesophageal reflux disease)     Heart failure (HCC)     cardiac stent    Hyperlipemia     Hypertension     Screen for colon cancer 6/11/2015    Stroke ([facility] [address]     [facility]     2000       Past Surgical History:  Past Surgical History:   Procedure Laterality Date    GA CARDIAC SURG PROCEDURE UNLIST      stent       Family History:  History reviewed. No pertinent family history. Social History:  Social History     Tobacco Use    Smoking status: Never Smoker    Smokeless tobacco: Never Used   Substance Use Topics    Alcohol use: No    Drug use: Not on file       Allergies:  No Known Allergies      Review of Systems   Review of Systems   Constitutional: Negative for chills and fever. HENT: Negative. Eyes: Negative. Respiratory: Negative for cough, chest tightness and shortness of breath. Cardiovascular: Negative for chest pain and leg swelling.    Gastrointestinal: Negative for abdominal pain, diarrhea, nausea and vomiting. Endocrine: Negative. Genitourinary: Negative for difficulty urinating and dysuria. Musculoskeletal: Negative for myalgias. Skin: Negative. Neurological: Negative. Psychiatric/Behavioral: Negative. All other systems reviewed and are negative. Physical Exam   Physical Exam  Vitals and nursing note reviewed. Constitutional:       General: He is not in acute distress. Appearance: He is well-developed. He is not diaphoretic. HENT:      Head: Normocephalic and atraumatic. Nose: Nose normal.      Mouth/Throat:      Pharynx: No oropharyngeal exudate. Eyes:      Conjunctiva/sclera: Conjunctivae normal.      Pupils: Pupils are equal, round, and reactive to light. Neck:      Vascular: No JVD. Cardiovascular:      Rate and Rhythm: Normal rate and regular rhythm. Heart sounds: Normal heart sounds. No murmur heard. No friction rub. Pulmonary:      Effort: Pulmonary effort is normal. No respiratory distress. Breath sounds: Normal breath sounds. No stridor. No wheezing or rales. Abdominal:      General: Bowel sounds are normal. There is no distension. Palpations: Abdomen is soft. Tenderness: There is no abdominal tenderness. There is no rebound. Musculoskeletal:         General: No tenderness. Cervical back: Normal range of motion and neck supple. Comments: Chronic contracture of left upper extremity   Skin:     General: Skin is warm and dry. Findings: No rash. Neurological:      Mental Status: He is alert. GCS: GCS eye subscore is 4. GCS verbal subscore is 5. GCS motor subscore is 6. Cranial Nerves: No cranial nerve deficit. Sensory: Sensation is intact. Comments: Chronic left sided contracture of upper extremity and decreased range of motion of left lower extremity.    Psychiatric:         Speech: Speech normal.         Behavior: Behavior normal.         Thought Content: Thought content normal.         Judgment: Judgment normal.           Diagnostic Study Results     Labs -     Recent Results (from the past 12 hour(s))   EKG, 12 LEAD, INITIAL    Collection Time: 10/07/21  5:05 AM   Result Value Ref Range    Ventricular Rate 68 BPM    Atrial Rate 68 BPM    P-R Interval 132 ms    QRS Duration 76 ms    Q-T Interval 424 ms    QTC Calculation (Bezet) 450 ms    Calculated P Axis 75 degrees    Calculated R Axis 56 degrees    Calculated T Axis 91 degrees    Diagnosis       Normal sinus rhythm  Normal ECG  When compared with ECG of 21-MAR-2021 15:15,  No significant change was found     CBC WITH AUTOMATED DIFF    Collection Time: 10/07/21  5:12 AM   Result Value Ref Range    WBC 7.4 4.1 - 11.1 K/uL    RBC 5.01 4.10 - 5.70 M/uL    HGB 15.7 12.1 - 17.0 g/dL    HCT 43.1 36.6 - 50.3 %    MCV 86.0 80.0 - 99.0 FL    MCH 31.3 26.0 - 34.0 PG    MCHC 36.4 30.0 - 36.5 g/dL    RDW 14.2 11.5 - 14.5 %    PLATELET 622 615 - 835 K/uL    MPV 9.1 8.9 - 12.9 FL    NRBC 0.0 0  WBC    ABSOLUTE NRBC 0.00 0.00 - 0.01 K/uL    NEUTROPHILS 84 (H) 32 - 75 %    LYMPHOCYTES 11 (L) 12 - 49 %    MONOCYTES 5 5 - 13 %    EOSINOPHILS 0 0 - 7 %    BASOPHILS 0 0 - 1 %    IMMATURE GRANULOCYTES 0 0.0 - 0.5 %    ABS. NEUTROPHILS 6.2 1.8 - 8.0 K/UL    ABS. LYMPHOCYTES 0.8 0.8 - 3.5 K/UL    ABS. MONOCYTES 0.4 0.0 - 1.0 K/UL    ABS. EOSINOPHILS 0.0 0.0 - 0.4 K/UL    ABS. BASOPHILS 0.0 0.0 - 0.1 K/UL    ABS. IMM.  GRANS. 0.0 0.00 - 0.04 K/UL    DF AUTOMATED     METABOLIC PANEL, COMPREHENSIVE    Collection Time: 10/07/21  5:12 AM   Result Value Ref Range    Sodium 137 136 - 145 mmol/L    Potassium 4.0 3.5 - 5.1 mmol/L    Chloride 105 97 - 108 mmol/L    CO2 28 21 - 32 mmol/L    Anion gap 4 (L) 5 - 15 mmol/L    Glucose 193 (H) 65 - 100 mg/dL    BUN 20 6 - 20 MG/DL    Creatinine 1.20 0.70 - 1.30 MG/DL    BUN/Creatinine ratio 17 12 - 20      GFR est AA >60 >60 ml/min/1.73m2    GFR est non-AA >60 >60 ml/min/1.73m2 Calcium 9.3 8.5 - 10.1 MG/DL    Bilirubin, total 0.8 0.2 - 1.0 MG/DL    ALT (SGPT) 14 12 - 78 U/L    AST (SGOT) 13 (L) 15 - 37 U/L    Alk. phosphatase 124 (H) 45 - 117 U/L    Protein, total 7.9 6.4 - 8.2 g/dL    Albumin 3.5 3.5 - 5.0 g/dL    Globulin 4.4 (H) 2.0 - 4.0 g/dL    A-G Ratio 0.8 (L) 1.1 - 2.2     MAGNESIUM    Collection Time: 10/07/21  5:12 AM   Result Value Ref Range    Magnesium 2.0 1.6 - 2.4 mg/dL   LIPASE    Collection Time: 10/07/21  5:12 AM   Result Value Ref Range    Lipase 97 73 - 393 U/L   ETHYL ALCOHOL    Collection Time: 10/07/21  5:15 AM   Result Value Ref Range    ALCOHOL(ETHYL),SERUM <06 <40 MG/DL   SALICYLATE    Collection Time: 10/07/21  5:15 AM   Result Value Ref Range    Salicylate level <0.6 (L) 2.8 - 20.0 MG/DL   ACETAMINOPHEN    Collection Time: 10/07/21  5:15 AM   Result Value Ref Range    Acetaminophen level <2 (L) 10 - 30 ug/mL       Radiologic Studies -   CT HEAD WO CONT   Final Result      Right frontal lobe infarct is late acute versus subacute, new since March. No intracranial hemorrhage. I discussed this impression with Dr. Nelson Araiza at 5484 hours on 10/7/2021. CTA HEAD NECK W CONT         XR CHEST PORT   Final Result      No acute process on portable chest. No change. CT Results  (Last 48 hours)               10/07/21 0620  CT HEAD WO CONT Final result    Impression:      Right frontal lobe infarct is late acute versus subacute, new since March. No intracranial hemorrhage. I discussed this impression with Dr. Nelson Araiza at 7905 hours on 10/7/2021. Narrative:  EXAM: CT HEAD WO CONT       INDICATION: Confusion and slurred speech. COMPARISON: CT head on 3/21/2021       TECHNIQUE: Noncontrast head CT. Coronal and sagittal reformats. CT dose   reduction was achieved through the use of a standardized protocol tailored for   this examination and automatic exposure control for dose modulation.        FINDINGS: Motion artifact obscures fine detail and limits sensitivity for   detecting pathology. The ventricles and sulci are age-appropriate without hydrocephalus. There is no   mass effect or midline shift. There is no intracranial hemorrhage or extra-axial   fluid collection. Hypoattenuation in the right frontal lobe is new and involves   loss of gray-white differentiation. Old left parietal lobe infarct is unchanged. Chronic microvascular ischemic disease is unchanged. The calvarium is intact. The visualized paranasal sinuses and mastoid air cells   are clear. 10/07/21 0620  CTA HEAD NECK W CONT Preliminary result    Narrative:  *RELIMINARY REPORT*       Multifocal atherosclerosis. No vascular occlusion. Moderate stenosis of the   proximal left internal carotid artery. Mild stenosis of the proximal right   internal carotid artery. Left vertebral artery is dominant. At least moderate   stenosis of the basilar artery. No aneurysm. No dissection. No pathologic   intracranial enhancement. Right frontal lobe infarct is acute or subacute. Preliminary report was provided by Dr. Christopher Leahy, the on-call radiologist, at 0654   hours       Final report to follow. *END PRELIMINARY REPORT*                                   CXR Results  (Last 48 hours)               10/07/21 0540  XR CHEST PORT Final result    Impression:      No acute process on portable chest. No change. Narrative:  EXAM: XR CHEST PORT       INDICATION: Confusion, lethargy. COMPARISON: CT chest on 8/27/2020. Portable chest on 3/21/2021. TECHNIQUE: Semiupright portable chest AP view       FINDINGS: Cardiac monitoring wires overlie the thorax. The cardiomediastinal and   hilar contours are within normal limits. The pulmonary vasculature is within   normal limits. The lungs and pleural spaces are clear. Osteopenia is unchanged. Medical Decision Making   I am the first provider for this patient.     I reviewed the vital signs, available nursing notes, past medical history, past surgical history, family history and social history. Vital Signs-Reviewed the patient's vital signs. Patient Vitals for the past 12 hrs:   Temp Pulse Resp BP SpO2   10/07/21 0502 98.3 °F (36.8 °C) 70 17 (!) 157/115 99 %       Pulse Oximetry Analysis - 99% on RA, normal  Rate: 70 bpm  Rhythm: Normal sinus      Provider Notes (Medical Decision Making):     DDX:  UTI, electrolyte derangement, dehydration, intracranial bleed, intracranial pathology, acute worsening of previously noted stroke    Plan:  Head CT, CTA, UA, labs, EKG    Patient with unknown timeline for onset of symptoms potentially being up to 3 days. Will further evaluate with CT and CTA but without known timeline patient does not qualify for TPA, if CTA reveals large vessel occlusion will discuss with interventional radiology. Impression:  Subacute frontal lobe infarct    ED Course:   Initial assessment performed. The patients presenting problems have been discussed, and they are in agreement with the care plan formulated and outlined with them. I have encouraged them to ask questions as they arise throughout their visit. I reviewed the nursing notes and and vital signs from today's visit, as well as the electronic medical record system for any past medical records that were available that may contribute to the patients current condition, including Previous evaluation in the emergency department for chest pain, noted previous head CT with left frontal infarct    Nursing notes will be reviewed as they become available in realtime while the pt has been in the ED.   Felipe Steel MD    HYPERTENSION COUNSELING:  Patient made aware of their elevated blood pressure and is instructed to follow up this week with their Primary Care or [address] [id number] for a recheck (should they be discharged.) Patient is counseled regarding consequences of chronic, uncontrolled hypertension including kidney disease, heart disease, stroke or even death. Patient states their understanding    EKG interpretation 0505: NSR, nl Axis, rate 68; , QRS 76, QTc 450; NO STEMI; interpreted by Arsalan Babin MD    I personally reviewed/interpreted pt's imaging. Agree with official read by radiology as noted above. Arsalan Babin MD    PROGRESS NOTE:  0700  Radiology of significant findings on CT concerning for subacute frontal lobe infarct. Will plan for admission to hospitalist service for further evaluation. Arsalan Babin MD    CONSULT NOTE:   7:05 AM  Arsalan Babin MD spoke with Dr. Jeffry Gregg,   Specialty: Jose F Gregg due to subacute stroke. Discussed pt's HPI and available diagnostic results thus far. Expressed concerns for needed admission. Consultant will evaluate for admission. Arsalan Babin MD    ADMISSION NOTE:  7:05 AM  Patient is being admitted to the hospital by Dr. Jeffry Gregg. The results of their tests and reasons for their admission have been discussed with them and/or available family. They convey agreement and understanding for the need to be admitted and for their admission diagnosis.    Arsalan Babin MD             Critical Care Time:       CRITICAL CARE NOTE  IMPENDING DETERIORATION -Airway, Respiratory, Cardiovascular, CNS and Metabolic  ASSOCIATED RISK FACTORS - Hypotension, Dysrhythmia, Metabolic changes, Dehydration, Vascular Compromise and CNS Decompensation  MANAGEMENT- Bedside Assessment and Supervision of Care  INTERPRETATION -  CT Scan, ECG, Blood Pressure and Cardiac Output Measures   INTERVENTIONS - hemodynamic mngmt, vascular control, Neurologic interventions  and Metobolic interventions  CASE REVIEW - Hospitalist/Intensivist and Nursing  TREATMENT RESPONSE -Improved  PERFORMED BY - Self  NOTES   :  I have spent 60 minutes of critical care time involved in lab review, consultations with specialist, family decision- making, bedside attention and documentation excluding time spent on any separately billed procedures. During this entire length of time I was immediately available to the patient . Alice Maciel MD      Diagnosis     Clinical Impression:   1. Ischemic stroke of frontal lobe ([facility] [address]        PLAN:  1.  Admit to hospitalist

## 2021-10-07 NOTE — ED NOTES
Assumed care of pt from triage. Pt resting in stretcher in POC and remains on the monitor x 3, pt is hypertensive. Speaking to pt sister, per sister pt was seen at PCP and received a \"shot. \" Afterwards pt was extremely confused from baseline and had difficulty ambulating - at baseline pt uses cane as needed. Per sister pt orientation baseline is a/o x 2, pt a/o x 1. Pt had previous CVA w/ L sided deficit. Pt sister Jarocho Mercedes [phone]    1421 Pt dysphagia screen passed and pt medicated per orders, including prn hydralazine for HTN. Incontinence care provided and patient placed on male external catheter device     1140 Spoke w/ pt sister to complete MRI form, MRI form faxed     1230 Pt XIAO to MRI - insulin administration delayed     1350 Pt medicated per orders     1355 Attempted to call report x 2 with no answer. Third attempt RN unable to take report but will call back     1440 Attempted to call report to inpatient unit - placed on hold for > 7 min     Patient is being transferred to Neuro, Room # [phone]. Report given to Carlos Jade RN on Ney Esteban for routine progression of care. Report consisted of the following information SBAR, Kardex, ED Summary, MAR and Recent Results. Patient transferred to receiving unit by: transport      Outstanding consults needed: case management     Next labs due: 0400 am      The following personal items will be sent with the patient during transfer to the floor:     All valuables:    Cardiac monitoring ordered: Yes    The following CURRENT information was reported to the receiving RN:    Code status: Full Code at time of transfer    Last set of vital signs:  Vital Signs  Level of Consciousness: Alert (0) (10/07/21 1351)  Temp: 97.5 °F (36.4 °C) (10/07/21 1351)  Temp Source: Oral (10/07/21 1351)  Pulse (Heart Rate): 74 (10/07/21 1351)  Heart Rate Source: Monitor (10/07/21 1351)  Cardiac Rhythm: Sinus Rhythm (10/07/21 0600)  Resp Rate: 17 (10/07/21 1351)  BP: (!) 168/89 (10/07/21 1351)  MAP (Monitor): 111 (10/07/21 1130)  MAP (Calculated): 115 (10/07/21 1351)  BP 1 Location: Right arm (10/07/21 0502)  BP 1 Method: Automatic (10/07/21 0502)  BP Patient Position: At rest (10/07/21 0502)  MEWS Score: 1 (10/07/21 1351)         Oxygen Therapy  O2 Sat (%): 100 % (10/07/21 1351)  Pulse via Oximetry: 75 beats per minute (10/07/21 1130)  O2 Device: None (Room air) (10/07/21 1351)      Last pain assessment:  Pain 1  Pain Scale 1: Numeric (0 - 10)  Pain Intensity 1: 0      Wounds: No     Urinary catheter: voiding and external catheter  Is there a wong order: No     LDAs:       Peripheral IV 10/07/21 Left Forearm (Active)         Opportunity for questions and clarification was provided.     Shilpi Galindo RN

## 2021-10-07 NOTE — PROGRESS NOTES
Speech pathology note  Patient currently off the floor. Will follow up when patient available. Thank you.     Mavis Aguilera., CCC-SLP

## 2021-10-08 NOTE — PROGRESS NOTES
Problem: Falls - Risk of  Goal: *Absence of Falls  Description: Document Deborah Choe Fall Risk and appropriate interventions in the flowsheet.   Outcome: Progressing Towards Goal  Note: Fall Risk Interventions:  Mobility Interventions: Bed/chair exit alarm, Patient to call before getting OOB    Mentation Interventions: Adequate sleep, hydration, pain control, Bed/chair exit alarm, More frequent rounding, Reorient patient    Medication Interventions: Bed/chair exit alarm, Patient to call before getting OOB, Teach patient to arise slowly    Elimination Interventions: Bed/chair exit alarm, Call light in reach, Patient to call for help with toileting needs, Stay With Me (per policy), Toilet paper/wipes in reach    History of Falls Interventions: Bed/chair exit alarm, Room close to nurse's station         Problem: TIA/CVA Stroke: Day 2 Until Discharge  Goal: Activity/Safety  Outcome: Progressing Towards Goal  Goal: Nutrition/Diet  Outcome: Not Progressing Towards Goal  Goal: Medications  Outcome: Not Progressing Towards Goal  Goal: Respiratory  Outcome: Progressing Towards Goal  Goal: Psychosocial  Outcome: Progressing Towards Goal  Goal: *Verbalizes anxiety and depression are reduced or absent  Outcome: Not Progressing Towards Goal  Goal: *Absence of aspiration  Outcome: Progressing Towards Goal  Goal: *Absence of deep venous thrombosis signs and symptoms(Stroke Metric)  Outcome: Progressing Towards Goal  Goal: *Optimal pain control at patient's stated goal  Outcome: Progressing Towards Goal  Goal: *Tolerating diet  Outcome: Not Progressing Towards Goal  Goal: *Ability to perform ADLs and demonstrates progressive mobility and function  Outcome: Not Progressing Towards Goal  Goal: *Stroke education continued(Stroke Metric)  Outcome: Progressing Towards Goal

## 2021-10-08 NOTE — PROGRESS NOTES
Problem: Falls - Risk of  Goal: *Absence of Falls  Description: Document Oneyda Amos Fall Risk and appropriate interventions in the flowsheet. Outcome: Progressing Towards Goal  Note: Fall Risk Interventions:  Mobility Interventions: Bed/chair exit alarm, Communicate number of staff needed for ambulation/transfer, Patient to call before getting OOB, OT consult for ADLs, PT Consult for mobility concerns, PT Consult for assist device competence    Mentation Interventions: Adequate sleep, hydration, pain control, Bed/chair exit alarm, Door open when patient unattended, Evaluate medications/consider consulting pharmacy, More frequent rounding, Increase mobility, Reorient patient, Room close to nurse's station, Update white board    Medication Interventions: Bed/chair exit alarm, Evaluate medications/consider consulting pharmacy, Patient to call before getting OOB, Teach patient to arise slowly    Elimination Interventions: Bed/chair exit alarm, Call light in reach, Patient to call for help with toileting needs, Stay With Me (per policy), Toileting schedule/hourly rounds    History of Falls Interventions: Bed/chair exit alarm, Door open when patient unattended, Evaluate medications/consider consulting pharmacy, Room close to nurse's station         Problem: TIA/CVA Stroke: 0-24 hours  Goal: Activity/Safety  Outcome: Progressing Towards Goal  Goal: Consults, if ordered  Outcome: Progressing Towards Goal  Note: Neurology consulted  Goal: Diagnostic Test/Procedures  Outcome: Progressing Towards Goal  Note: CT, MRI, Echo, and EEG completed.   Goal: Nutrition/Diet  Outcome: Progressing Towards Goal  Goal: Discharge Planning  Outcome: Progressing Towards Goal  Goal: Medications  Outcome: Progressing Towards Goal  Goal: Respiratory  Outcome: Progressing Towards Goal  Goal: Treatments/Interventions/Procedures  Outcome: Progressing Towards Goal  Goal: Psychosocial  Outcome: Progressing Towards Goal  Goal: *Hemodynamically stable  Outcome: Progressing Towards Goal  Goal: *Neurologically stable  Description: Absence of additional neurological deficits    Outcome: Progressing Towards Goal  Goal: *Verbalizes anxiety and depression are reduced or absent  Outcome: Progressing Towards Goal  Goal: *Absence of Signs of Aspiration on Current Diet  Outcome: Progressing Towards Goal  Goal: *Absence of deep venous thrombosis signs and symptoms(Stroke Metric)  Outcome: Progressing Towards Goal  Goal: *Ability to perform ADLs and demonstrates progressive mobility and function  Outcome: Progressing Towards Goal  Goal: *Stroke education started(Stroke Metric)  Outcome: Progressing Towards Goal  Goal: *Dysphagia screen performed(Stroke Metric)  Outcome: Progressing Towards Goal     Problem: Pressure Injury - Risk of  Goal: *Prevention of pressure injury  Description: Document Niraj Scale and appropriate interventions in the flowsheet. Outcome: Progressing Towards Goal  Note: Pressure Injury Interventions:  Sensory Interventions: Assess changes in LOC, Maintain/enhance activity level, Minimize linen layers, Turn and reposition approx. every two hours (pillows and wedges if needed)    Moisture Interventions: Absorbent underpads, Check for incontinence Q2 hours and as needed, Maintain skin hydration (lotion/cream), Minimize layers    Activity Interventions: Increase time out of bed, Pressure redistribution bed/mattress(bed type), PT/OT evaluation    Mobility Interventions: HOB 30 degrees or less, Pressure redistribution bed/mattress (bed type), Turn and reposition approx.  every two hours(pillow and wedges)    Nutrition Interventions: Document food/fluid/supplement intake    Friction and Shear Interventions: HOB 30 degrees or less, Minimize layers

## 2021-10-08 NOTE — PROGRESS NOTES
Problem: Mobility Impaired (Adult and Pediatric)  Goal: *Acute Goals and Plan of Care (Insert Text)  Description: (PLOF copied from OT  goals)  FUNCTIONAL STATUS PRIOR TO ADMISSION: Pt is limited/unreliable historian. Pt report he was living with son (works full time) and sister (could not recall if she works), did not req assist with ADLs and using SPC. Reports he was not driving or working. Pt does have hx of stroke. HOME SUPPORT: The patient lived with son and sister. Physical Therapy Goals  Initiated 10/8/2021  1. Patient will move from supine to sit and sit to supine  in bed with moderate assistance  within 7 day(s). 2.  Patient will transfer from bed to chair and chair to bed with moderate assistance  using the least restrictive device within 7 day(s). 3.  Patient will perform sit to stand with moderate assistance  within 7 day(s). 4.  Patient will ambulate with moderate assistance  for 25 feet with the least restrictive device within 7 day(s). 5.  Patient will ascend/descend 3 stairs with 1 handrail(s) with moderate assistance  within 7 day(s). 6.  Patient will improve Knight Balance score by 7 points within 7 days. Outcome: Progressing Towards Goal   PHYSICAL THERAPY EVALUATION- NEURO POPULATION  Patient: Dion Kovacs (63 y.o. male)  Date: 10/8/2021  Primary Diagnosis: Stroke (cerebrum) ([facility] [address] [I63.9]        Precautions:   Fall, Other (comment) (L sided weakness)      ASSESSMENT  Based on the objective data described below, the patient presents with lethargy, poor command following, left sided weakness, poor balance, and signs of orthostatic hypotension following admission for a CVA. Imaging has revealed a right frontal acute vs subacute CVA. Patient was hypertensive during OT session and medicated afterward. Patient symptomatic of hypotension when attempting mobility during PT evaluation.  Discussed low BP with nursing and patient left in NAD    The patient was a poor historian d/t lethargy but required max-total assist for all mobility during evaluation. Will progress towards functional goals and recommend rehab disposition. Current Level of Function Impacting Discharge (mobility/balance): max-total assist for bed mobility         Patient will benefit from skilled therapy intervention to address the above noted impairments. PLAN :  Recommendations and Planned Interventions: bed mobility training, transfer training, gait training, therapeutic exercises, neuromuscular re-education, and therapeutic activities      Frequency/Duration: Patient will be followed by physical therapy:  5 times a week to address goals, pending improved ability to participate    Recommendation for discharge: (in order for the patient to meet his/her long term goals)  Rehab, SNF vs IPR TBD      IF patient discharges home will need the following DME: to be determined (TBD)         SUBJECTIVE:   Patient stated Ya'll are worrying me.     OBJECTIVE DATA SUMMARY:   HISTORY:    Past Medical History:   Diagnosis Date    CAD (coronary artery disease)     Cancer ([facility] [address]     prostate    Diabetes ([facility] [address]     GERD (gastroesophageal reflux disease)     Heart failure (HCC)     cardiac stent    Hyperlipemia     Hypertension     Screen for colon cancer 6/11/2015    Stroke ([facility] [address]     Stroke ([facility] [address]     2000     Past Surgical History:   Procedure Laterality Date    ND CARDIAC SURG PROCEDURE UNLIST      stent       Personal factors and/or comorbidities impacting plan of care:     Home Situation  Home Environment: Private residence  # Steps to Enter: 8  Rails to Enter: Yes  Hand Rails : Bilateral  One/Two Story Residence: One story  Living Alone: No  Support Systems: Other Family Member(s) (sister; son)  Patient Expects to be Discharged to[de-identified] Unknown  Current DME Used/Available at Home: Cane, straight  Tub or Shower Type: Tub/Shower combination    EXAMINATION/PRESENTATION/DECISION MAKING:   Critical Behavior:  Neurologic State: Alert, Confused  Orientation Level: Oriented to person, Disoriented to place, Disoriented to situation, Disoriented to time  Cognition: Impaired decision making, Decreased attention/concentration, Poor safety awareness  Safety/Judgement: Decreased awareness of environment, Decreased awareness of need for assistance, Decreased awareness of need for safety, Decreased insight into deficits  Hearing: Auditory  Auditory Impairment: None  Skin:    Edema:   Range Of Motion:  AROM: Generally decreased, functional           PROM: Generally decreased, functional           Strength:    Strength: Generally decreased, functional                    Tone & Sensation:   Tone: Abnormal (LUE theron)              Sensation: Impaired (unable to detect stimuli on LUe)               Coordination:  Coordination: Generally decreased, functional  Vision:   Tracking:  (not tested; decreased ability to see therapist on L side)  Functional Mobility:  Bed Mobility:  Rolling: Maximum assistance  Supine to Sit: Total assistance;Maximum assistance  Sit to Supine: Maximum assistance  Scooting: Maximum assistance  Transfers:                             Balance:   Sitting: Impaired; With support;High guard  Sitting - Static: Poor (constant support)  Sitting - Dynamic: Poor (constant support)         Physical Therapy Evaluation Charge Determination   History Examination Presentation Decision-Making   MEDIUM  Complexity : 1-2 comorbidities / personal factors will impact the outcome/ POC  MEDIUM Complexity : 3 Standardized tests and measures addressing body structure, function, activity limitation and / or participation in recreation  MEDIUM Complexity : Evolving with changing characteristics  MEDIUM Complexity : FOTO score of 26-74      Based on the above components, the patient evaluation is determined to be of the following complexity level: MEDIUM      Activity Tolerance:   Poor and signs and symptoms of orthostatic hypotension      After treatment patient left in no apparent distress:   Supine in bed and Call bell within reach    COMMUNICATION/EDUCATION:   The patients plan of care was discussed with: Registered nurse. Fall prevention education was provided and the patient/caregiver indicated understanding. and Patient is unable to participate in goal setting and plan of care.     Thank you for this referral.  Aly Calvillo, PT, DPT   Time Calculation: 28 mins

## 2021-10-08 NOTE — PROCEDURES
[address]     Electroencephalogram Report    Date: 10/08/21    Patient Name: Jennifer Garcia  YOB: 1955   Medical Record No: [id number]    Procedure ID: [id number]  Procedure Type: routine    INDICATION: Altered mental status    STATE: Awake    IMPRESSION:  NOrmal EEG  Absence of seizures on this study does not exclude epilepsy. Clinical correlation is advised. DESCRIPTION OF PROCEDURE: Electrodes were applied in accordance with the international 10-20 system of electrode placement. EEG was reviewed in both bipolar and referential montages. DESCRIPTION OF FINDINGS: Background consists of symmetric 10 Hz rhythm. This rhythm attenuates with eye opening. With drowsiness there is a drop off of the background. Photic stimulation did not bring about a clearly discernable occipital drive. No seizures were noted. No apparent spike waves or focal slowing noted. Medications:  Current Facility-Administered Medications   Medication Dose Route Frequency    ondansetron (ZOFRAN) injection 4 mg  4 mg IntraVENous Q4H PRN    hydrALAZINE (APRESOLINE) 20 mg/mL injection 10 mg  10 mg IntraVENous Q6H PRN    aspirin delayed-release tablet 81 mg  81 mg Oral DAILY    atorvastatin (LIPITOR) tablet 40 mg  40 mg Oral DAILY    clopidogreL (PLAVIX) tablet 75 mg  75 mg Oral DAILY    . PHARMACY TO SUBSTITUTE PER PROTOCOL (Reordered from: Dutasteride-Tamsulosin 0.5-0.4 mg CM24)    Per Protocol    gabapentin (NEURONTIN) capsule 100 mg  100 mg Oral TID    tamsulosin (FLOMAX) capsule 0.4 mg  0.4 mg Oral DAILY    sertraline (ZOLOFT) tablet 75 mg  75 mg Oral DAILY    metoprolol tartrate (LOPRESSOR) tablet 50 mg  50 mg Oral BID    lisinopriL (PRINIVIL, ZESTRIL) tablet 20 mg  20 mg Oral DAILY    acetaminophen (TYLENOL) tablet 650 mg  650 mg Oral Q4H PRN    Or    acetaminophen (TYLENOL) solution 650 mg  650 mg Per NG tube Q4H PRN Or    acetaminophen (TYLENOL) suppository 650 mg  650 mg Rectal Q4H PRN    enoxaparin (LOVENOX) injection 40 mg  40 mg SubCUTAneous Q24H    insulin lispro (HUMALOG) injection   SubCUTAneous AC&HS    glucose chewable tablet 16 g  4 Tablet Oral PRN    dextrose (D50W) injection syrg 12.5-25 g  12.5-25 g IntraVENous PRN    glucagon (GLUCAGEN) injection 1 mg  1 mg IntraMUSCular PRN    isosorbide mononitrate ER (IMDUR) tablet 60 mg  60 mg Oral DAILY

## 2021-10-08 NOTE — PROGRESS NOTES
Problem: Patient Education: Go to Patient Education Activity  Goal: Patient/Family Education  Outcome: Progressing Towards Goal     Problem: Patient Education: Go to Patient Education Activity  Goal: Patient/Family Education  Outcome: Progressing Towards Goal     Problem: TIA/CVA Stroke: 0-24 hours  Goal: Off Pathway (Use only if patient is Off Pathway)  Outcome: Progressing Towards Goal

## 2021-10-08 NOTE — PROGRESS NOTES
Problem: Dysphagia (Adult)  Goal: *Acute Goals and Plan of Care (Insert Text)  Description: Speech Therapy Goals  Initiated 10/8/2021  1. Patient will tolerate moist and minced diet, thin liquids without overt s/s aspiration within 7 days. Outcome: Not Met     SPEECH LANGUAGE PATHOLOGY BEDSIDE SWALLOW EVALUATION  Patient: Corina Munguia (54 y.o. male)  Date: 10/8/2021  Primary Diagnosis: Stroke (cerebrum) [facility]) [I63.9]        Precautions: Aspiration  Fall    ASSESSMENT :  Based on the objective data described below, the patient presents with moderate oral and suspected mild pharyngeal dysphagia. Dysphagia characterized by suspected oral motor weakness (unable to determine given poor participation in oral motor assessment) and suspected pharyngeal swallow delay. Dysphagia resulted in slow oral manipulation, anterior spillage with poor bolus awareness and  delayed oral transit with oral residue. No overt s/s aspiration. Risk of aspiration is elevated given dysphagia in setting of CVA and impaired mentation. Given bedside presentation feel patient is safest for moist and minced diet with thin liquids. Patient will benefit from skilled intervention to address the above impairments. Patients rehabilitation potential is considered to be Good     PLAN :  Recommendations and Planned Interventions:  Moist and minced diet  Thin liquids  Sit up for all PO  Small bites  Alternate solids and liquids  Medication crushed in puree  Frequency/Duration: Patient will be followed by speech-language pathology 3 times a week to address goals. Discharge Recommendations: To Be Determined     SUBJECTIVE:   Patient stated You tell me. Patient agreed to PO trials. RN at bedside with medication.     OBJECTIVE:     Past Medical History:   Diagnosis Date    CAD (coronary artery disease)     Cancer ([facility] [address]     prostate    Diabetes ([facility] [address]     GERD (gastroesophageal reflux disease)     Heart failure (HCC)     cardiac stent Hyperlipemia     Hypertension     Screen for colon cancer 6/11/2015    Stroke [facility])     Stroke [facility])     2000     Past Surgical History:   Procedure Laterality Date    IN CARDIAC SURG PROCEDURE UNLIST      stent     Prior Level of Function/Home Situation:   Home Situation  Home Environment: Private residence  # Steps to Enter: 8  Rails to Enter: Yes  Hand Rails : Bilateral  One/Two Story Residence: One story  Living Alone: No  Support Systems: Other Family Member(s) (sister; son)  Patient Expects to be Discharged to[de-identified] Unknown  Current DME Used/Available at Home: Cane, straight  Tub or Shower Type: Tub/Shower combination  Diet prior to admission: Unknown  Current Diet:  Regular   Cognitive and Communication Status:  Neurologic State: Alert  Orientation Level: Disoriented to place, Disoriented to time, Oriented to person, Oriented to situation  Cognition: Poor safety awareness, Decreased command following, Decreased attention/concentration, Impaired decision making  Perception:  (Impaired  )  Perseveration: Perseverates during conversation  Safety/Judgement: Decreased awareness of environment, Decreased awareness of need for assistance, Decreased awareness of need for safety, Decreased insight into deficits  Oral Assessment:  Oral Assessment  Labial: Decreased rate  Dentition: Limited;Natural;Poor  Oral Hygiene: Moist oral mucosa, oral residue  Lingual:  (Unable to assess)  Velum: Unable to visualize  P.O. Trials:  Patient Position: Upright in bed  Vocal quality prior to P.O.: No impairment  Consistency Presented: Pill/Tablet;Puree; Solid; Thin liquid  How Presented: Self-fed/presented;SLP-fed/presented;Spoon;Straw     Bolus Acceptance: No impairment  Bolus Formation/Control: Impaired  Type of Impairment: Delayed;Drooling;Poor;Mastication  Propulsion: Delayed (# of seconds); Discoordination  Oral Residue: 10-50% of bolus  Initiation of Swallow: Delayed (# of seconds)  Laryngeal Elevation: Functional  Aspiration Signs/Symptoms: None                Oral Phase Severity: Moderate  Pharyngeal Phase Severity : Mild    NOMS:   The NOMS functional outcome measure was used to quantify this patient's level of swallowing impairment. Based on the NOMS, the patient was determined to be at level 4 for swallow function       NOMS Swallowing Levels:  Level 1 (CN): NPO  Level 2 (CM): NPO but takes consistency in therapy  Level 3 (CL): Takes less than 50% of nutrition p.o. and continues with nonoral feedings; and/or safe with mod cues; and/or max diet restriction  Level 4 (CK): Safe swallow but needs mod cues; and/or mod diet restriction; and/or still requires some nonoral feeding/supplements  Level 5 (CJ): Safe swallow with min diet restriction; and/or needs min cues  Level 6 (CI): Independent with p.o.; rare cues; usually self cues; may need to avoid some foods or needs extra time  Level 7 ([address] [place] [address]): Independent for all p.o.  OTF. (2003). National Outcomes Measurement System (NOMS): Adult Speech-Language Pathology User's Guide. After treatment:   Patient left in no apparent distress in bed, Call bell within reach, and Nursing notified    COMMUNICATION/EDUCATION:   Patient was educated regarding role of SLP, diet and POC. Patient did not respond. Suspect poor comprehension. The patient's plan of care including recommendations, planned interventions, and recommended diet changes were discussed with: Registered nurse. Patient/family have participated as able in goal setting and plan of care. Patient/family agree to work toward stated goals and plan of care.     Thank you for this referral.  MAXIMILIAN Trevizo  Time Calculation: 16 mins

## 2021-10-08 NOTE — PROGRESS NOTES
Problem: Self Care Deficits Care Plan (Adult)  Goal: *Acute Goals and Plan of Care (Insert Text)  Description: FUNCTIONAL STATUS PRIOR TO ADMISSION: Pt is limited/unreliable historian. Pt report he was living with son (works full time) and sister (could not recall if she works), did not req assist with ADLs and using SPC. Reports he was not driving or working. Pt does have hx of stroke. HOME SUPPORT: The patient lived with son and sister. Occupational Therapy Goals  Initiated 10/8/2021   1. Patient will perform grooming with modified independence within 7 day(s). 2.  Patient will perform upper body dressing with minimal assistance/contact guard assist within 7 day(s). 3.  Patient will perform lower body dressing with moderate assistance  within 7 day(s). 4.  Patient will perform toilet transfers with moderate assistance  within 7 day(s). 5.  Patient will perform all aspects of toileting with moderate assistance  within 7 day(s). 6.  Patient will participate in upper extremity therapeutic exercise/activities with maximal assistance within 7 day(s). 7.  Patient will utilize energy conservation techniques during functional activities with verbal cues within 7 day(s). 8.  Patient will improve their Fugl Easley score by 5 points in prep for ADLs within 7 days. Outcome: Progressing Towards Goal     OCCUPATIONAL THERAPY EVALUATION  Patient: Sanjuanita Zepeda (11 y.o. male)  Date: 10/8/2021  Primary Diagnosis: Stroke (cerebrum) ([facility] [I63.9]        Precautions:   Fall, Other (comment) (L sided weakness)    ASSESSMENT  Based on the objective data described below, the patient presents with confusion, sig L sided weakness, poor L sided sensation on LUE/LLE, decreased ability to follow commands, and required up to max A-total A for ADLs. Pt agreeable to session when initiated, however as session progressed pt with inconsistent \"yes/no\" responses. Pt required max A to roll toward L side and transition to EOB. Pt required mod A to maintian balance, max A to [facility]/don hospital gown. Pt noted to have spillage/saliva from L side of mouth when sitting. Pt fatiguing quickly at EOB requiring max A to maintain seated balance prior to transitioning back to supine with max A. Pt required total A to reposition. Once repositioned pt provided with wash cloth, noted to only wash R side of face req assist to was L side. During UE assessment, pt noted to have no distal or proximal activation, unable to detect stimuli on LUE/LLE/L side of face (able to state he could feel when R side of body was assessed), however pt often stating \"no\" when cued for testing as well - for these reasons unsure of accuracy. When positioned on L side pt noted to have decreased awareness of stimuli. During session pt also noted to have elevated BP as well (see vitals documentation). Currently unsure of pts baseline performance - pending this information recommending SNF vs. IPR prior to return home. Current Level of Function Impacting Discharge (ADLs/self-care): up to total A    Functional Outcome Measure: The patient scored Total A-D  Total A-D (Motor Function): 10/66 on the Fugl-Easley Assessment which is indicative of severe impairment in upper extremity functional status. Other factors to consider for discharge: pt is poor historian - reports MOD I at baseline. Patient will benefit from skilled therapy intervention to address the above noted impairments. PLAN :  Recommendations and Planned Interventions: self care training, functional mobility training, therapeutic exercise, balance training, visual/perceptual training, therapeutic activities, endurance activities, neuromuscular re-education, patient education, home safety training, and family training/education    Frequency/Duration: Patient will be followed by occupational therapy 5 times a week to address goals.     Recommendation for discharge: (in order for the patient to meet his/her long term goals)  Therapy 3 hours per day 5-7 days per week vs SNF pending progress and confirmation of PLOF    This discharge recommendation:  A follow-up discussion with the attending provider and/or case management is planned    IF patient discharges home will need the following DME: TBD       SUBJECTIVE:   Patient stated I dont know man leave me alone.     OBJECTIVE DATA SUMMARY:   HISTORY:   Past Medical History:   Diagnosis Date    CAD (coronary artery disease)     Cancer ([facility]     prostate    Diabetes ([facility]     GERD (gastroesophageal reflux disease)     Heart failure (HCC)     cardiac stent    Hyperlipemia     Hypertension     Screen for colon cancer 6/11/2015    Stroke ([facility]     Stroke ([facility]     2000     Past Surgical History:   Procedure Laterality Date    AR CARDIAC SURG PROCEDURE UNLIST      stent     Expanded or extensive additional review of patient history:     Home Situation  Home Environment: Private residence  # Steps to Enter: 8  Rails to Enter: Yes  Hand Rails : Bilateral  One/Two Story Residence: One story  Living Alone: No  Support Systems: Other Family Member(s) (sister; son)  Patient Expects to be Discharged to[de-identified] Unknown  Current DME Used/Available at Home: Cane, straight  Tub or Shower Type: Tub/Shower combination    Hand dominance: Right    EXAMINATION OF PERFORMANCE DEFICITS:  Cognitive/Behavioral Status:  Neurologic State: Alert;Confused  Orientation Level: Oriented to person;Disoriented to place; Disoriented to situation;Disoriented to time  Cognition: Impaired decision making;Decreased attention/concentration;Poor safety awareness  Perception:  (Impaired  )  Perseveration: Perseverates during conversation  Safety/Judgement: Decreased awareness of environment;Decreased awareness of need for assistance;Decreased awareness of need for safety;Decreased insight into deficits    Skin: intact    Edema: no edema noted    Hearing:   Auditory  Auditory Impairment: None    Vision/Perceptual: Tracking:  (not tested; decreased ability to see therapist on L side)                                Range of Motion:    AROM: Generally decreased, functional  PROM: Generally decreased, functional                      Strength:    Strength: Generally decreased, functional                Coordination:  Coordination: Generally decreased, functional       Gross Motor Skills-Upper: Left Impaired;Right Intact    Tone & Sensation:    Tone: Abnormal (LUE theron)  Sensation: Impaired (unable to detect stimuli on LUe)                      Balance:  Sitting: Impaired; With support;High guard  Sitting - Static: Poor (constant support)  Sitting - Dynamic: Poor (constant support)    Functional Mobility and Transfers for ADLs:  Bed Mobility:  Rolling: Maximum assistance  Supine to Sit: Maximum assistance  Sit to Supine: Maximum assistance  Scooting: Maximum assistance    Transfers:       ADL Assessment:  Feeding: Minimum assistance    Oral Facial Hygiene/Grooming: Minimum assistance    Bathing: Maximum assistance    Upper Body Dressing: Maximum assistance    Lower Body Dressing: Maximum assistance    Toileting: Total assistance                ADL Intervention and task modifications:       Grooming  Washing Face: Minimum assistance  Cues: Verbal cues provided              Upper Body [address]: Maximum assistance (Seated EOB)  Cues: Physical assistance;Verbal cues provided    Lower Body Dressing Assistance  Socks:  Total assistance (dependent)  Position Performed: Supine         Cognitive Retraining  Safety/Judgement: Decreased awareness of environment;Decreased awareness of need for assistance;Decreased awareness of need for safety;Decreased insight into deficits     Functional Measure:  Fugl-Easley Assessment of Motor Recovery after Stroke:   Reflex Activity  Flexors/Biceps/Fingers: Can be elicited  Extensors/Triceps: Can be elicited  Reflex Subtotal: 4    Volitional Movement Within Synergies  Shoulder Retraction: None  Shoulder Elevation: None  Shoulder Abduction (90 degrees): None  Shoulder External Rotation: None  Elbow Flexion: None  Forearm Supination: None  Shoulder Adduction/Internal Rotation: None  Elbow Extension: None  Forearm Pronation: None  Subtotal: 0    Volitional Movement Mixing Synergies  Hand to Lumbar Spine: None  Shoulder Flexion (0-90 degrees): None  Pronation-Supination: None  Subtotal: 0    Volitional Movement With Little or No Synergy  Shoulder Abduction (0-90 degrees): None  Shoulder Flexion ( degrees): None  Pronation/Supination: None  Subtotal : 0    Normal Reflex Activity  Biceps, Triceps, Finger Flexors: Full  Subtotal : 2    Upper Extremity Total   Upper Extremity Total: 6    Wrist  Stability at 15 Degree Dorsiflexion: None  Repeated Dorsiflexion/ Volar Flexion: None  Stability at 15 Degree Dorsiflexion: None  Repeated Dorsiflexion/ Volar Flexion: None  Circumduction: None  Wrist Total: 0    Hand  Mass Flexion: None  Mass Extension: None  Grasp A: None  Grasp B: None  Grasp C: None  Grasp D: None  Grasp E: None  Hand Total: 0    Coordination/Speed  Tremor: None  Dysmetria: Marked  Time: <1s  Coordination/Speed Total : 4    Total A-D  Total A-D (Motor Function): 10/66     This is a reliable/valid measure of arm function after a neurological event. It has established value to characterize functional status and for measuring spontaneous and therapy-induced recovery; tests proximal and distal motor functions. Fugl-Easley Assessment  UE scores recorded between five and 30 days post neurologic event can be used to predict UE recovery at six months post neurologic event. Severe = 0-21 points   Moderately Severe = 22-33 points   Moderate = 34-47 points   Mild = 48-66 points  JANES Abdul, MAULIK Phillip, & MAYURI Barrera (1992). Measurement of motor recovery after stroke: Outcome assessment and sample size requirements. Stroke, 23, pp. 1427-3373. ------------------------------------------------------------------------------------------------------------------------------------------------------------------  MCID:  Stroke:   Jorge Rodriguez et al, 2001; n = 171; mean age 79 (6) years; assessed within 16 (12) days of stroke, Acute Stroke)  FMA Motor Scores from Admission to Discharge   10 point increase in FMA Upper Extremity = 1.5 change in discharge FIM   10 point increase in FMA Lower Extremity = 1.9 change in discharge FIM  MDC:   Stroke:   Yissel Christensen et al, 2008, n = 14, mean age = 59.9 (14.6) years, assessed on average 14 (6.5) months post stroke, Chronic Stroke)   FMA = 5.2 points for the Upper Extremity portion of the assessment     Occupational Therapy Evaluation Charge Determination   History Examination Decision-Making   LOW Complexity : Brief history review  LOW Complexity : 1-3 performance deficits relating to physical, cognitive , or psychosocial skils that result in activity limitations and / or participation restrictions  LOW Complexity : No comorbidities that affect functional and no verbal or physical assistance needed to complete eval tasks       Based on the above components, the patient evaluation is determined to be of the following complexity level: LOW   Pain Rating:  Pt did not endorse pain during session. Activity Tolerance:   Fair    After treatment patient left in no apparent distress:    Supine in bed, Call bell within reach, Bed / chair alarm activated, and Side rails x 3    COMMUNICATION/EDUCATION:   The patients plan of care was discussed with: Physical therapist, Occupational therapist, Registered nurse, and Case management. Patient/family agree to work toward stated goals and plan of care. This patients plan of care is appropriate for delegation to [facility].     Thank you for this referral.  Lucius De Oliveira OT  Time Calculation: 30 mins

## 2021-10-08 NOTE — PROGRESS NOTES
NEUROLOGY CONSULT    NAME Ava Baxter AGE 77 y.o. MRN [id number]  1955     REQUESTING PHYSICIAN: Gauri Zazueta MD      CHIEF COMPLAINT:  stroke     This is a 77 y.o. male with a medical history of hyperlipidemia who was admitted after his family found him in an altered state. Last well-known time was unknown. The patient is very somnolent and not answering any questions. ASSESSMENT AND PLAN     1. 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 Altered mental status  Will need an EEG       3. Bilateral stenosis of carotid and basilar  Continue brilinta+ aspirn + statin                 ALLERGIES:  Patient has no known allergies. Current Facility-Administered Medications   Medication Dose Route Frequency    ondansetron (ZOFRAN) injection 4 mg  4 mg IntraVENous Q4H PRN    hydrALAZINE (APRESOLINE) 20 mg/mL injection 10 mg  10 mg IntraVENous Q6H PRN    aspirin delayed-release tablet 81 mg  81 mg Oral DAILY    atorvastatin (LIPITOR) tablet 40 mg  40 mg Oral DAILY    clopidogreL (PLAVIX) tablet 75 mg  75 mg Oral DAILY    . PHARMACY TO SUBSTITUTE PER PROTOCOL (Reordered from: Dutasteride-Tamsulosin 0.5-0.4 mg CM24)    Per Protocol    gabapentin (NEURONTIN) capsule 100 mg  100 mg Oral TID    tamsulosin (FLOMAX) capsule 0.4 mg  0.4 mg Oral DAILY    sertraline (ZOLOFT) tablet 75 mg  75 mg Oral DAILY    metoprolol tartrate (LOPRESSOR) tablet 50 mg  50 mg Oral BID    lisinopriL (PRINIVIL, ZESTRIL) tablet 20 mg  20 mg Oral DAILY    acetaminophen (TYLENOL) tablet 650 mg  650 mg Oral Q4H PRN Or    acetaminophen (TYLENOL) solution 650 mg  650 mg Per NG tube Q4H PRN    Or    acetaminophen (TYLENOL) suppository 650 mg  650 mg Rectal Q4H PRN    enoxaparin (LOVENOX) injection 40 mg  40 mg SubCUTAneous Q24H    insulin lispro (HUMALOG) injection   SubCUTAneous AC&HS    glucose chewable tablet 16 g  4 Tablet Oral PRN    dextrose (D50W) injection syrg 12.5-25 g  12.5-25 g IntraVENous PRN    glucagon (GLUCAGEN) injection 1 mg  1 mg IntraMUSCular PRN    isosorbide mononitrate ER (IMDUR) tablet 60 mg  60 mg Oral DAILY       Past Medical History:   Diagnosis Date    CAD (coronary artery disease)     [facility]     pro[facility]    Diabetes [facility]     GERD (gastroesophageal reflux disease)     Heart failure (HCC)     cardiac stent    Hyperlipemia     Hypertension     Screen for colon cancer 6/11/2015    [facility] ([facility]     Stroke ([facility]     2000       Social History     Tobacco Use    Smoking status: Never Smoker    Smokeless tobacco: Never Used   Substance Use Topics    Alcohol use: No       History reviewed. No pertinent family history. Unobtainable        Visit Vitals  BP (!) 94/53 (BP Patient Position: Supine)   Pulse 69   Temp 98 °F (36.7 °C)   Resp 16   Ht 5' 8\" (1.727 m)   Wt 186 lb (84.4 kg)   SpO2 96%   BMI 28.28 kg/m²      General: Somnolent eyes closed   Head: Normocephalic, atraumatic, anicteric sclera   Neck Normal ROM,  No thyromegally   Lungs:  Clear to auscultatio. Cardiac: Regular rate and rhythm with no murmurs. Abd: Bowel sounds were audible. No tenderness on palpation   Ext: No pedal edema   Skin: Supple no rash     NeurologicExam:  Mental Status: Somnolent   Speech: Non verbal   Cranial Nerves:  Opens Eyes Spontaneously  Does not respond to sound  dolls eyes intact  PERRL  Corneal reflex intact  Symmetric grimmace     Motor: Withdraws extremities on the left . Reflexes:   Deep tendon reflexes 2+/4 and symmetric.    Sensory:   Withdraws to pin prick    Tremor:   No tremor noted.   Cerebellar:  Cannot be assessed     Neurovascular: No carotid bruits. No JVD       REVIEWED IMAGING:    MRI :  Results from Hospital Encounter encounter on 10/07/21    MRI BRAIN WO CONT    Narrative  EXAM: MRI BRAIN WO CONT    INDICATION: stroke workup    COMPARISON: CTA head neck 10/7/2021. CONTRAST: None. TECHNIQUE:  Multiplanar multisequence acquisition without contrast of the brain. FINDINGS:  Multiple acute to subacute infarcts scattered throughout the right frontal and  parietal lobes involving the cortex and white matter, largest involving the  right middle frontal gyrus. Additional punctate acute to subacute infarcts noted  in the bilateral occipital lobes. Generalized parenchymal volume loss with commensurate dilation of the sulci and  ventricular system. Scattered periventricular and deep white matter T2/FLAIR  hyperintensities, consistent with mild chronic microvascular ischemic disease. Small chronic infarcts in the left occipital lobe and right justus. There is no  acute hemorrhage, extra-axial fluid collection, or mass effect. There is no  cerebellar tonsillar herniation. Expected arterial flow-voids are present. Scattered mucosal thickening in the left frontal sinus, and bilateral ethmoidal  air cells without air-fluid level. The mastoid air cells and middle ears are  clear. The orbital contents are within normal limits. No significant osseous or  scalp lesions are identified. Impression  1. Multiple acute to subacute infarcts scattered throughout the right frontal  and parietal lobes (MCA territory), largest in the right middle frontal gyrus. Additional punctate acute to subacute infarcts in the bilateral occipital lobes. 2. Generalized parenchymal volume loss and mild chronic microvascular ischemic  disease. Small chronic infarcts in the left occipital lobe and right justus.       CT:  Results from Hospital Encounter encounter on 10/07/21    CTA HEAD NECK W CONT    Narrative  *PRELIMINARY REPORT*    Multifocal atherosclerosis. No vascular occlusion. Moderate stenosis of the  proximal left internal carotid artery. Mild stenosis of the proximal right  internal carotid artery. Left vertebral artery is dominant. At least moderate  stenosis of the basilar artery. No aneurysm. No dissection. No pathologic  intracranial enhancement. Right frontal lobe infarct is acute or subacute. Preliminary report was provided by Dr. Mandy Tellez, the on-call radiologist, at 0654  hours    Final report to follow. *END PRELIMINARY REPORT*    CLINICAL HISTORY: AMS, slurred speech  INDICATION: AMS, slurred speech    COMPARISON: 10/6/2021. CONTRAST: 100 ml [id number]    TECHNIQUE:  Unenhanced  images were obtained to localize the volume for  acquisition. Multislice helical axial CT angiography was performed from the  aortic arch to the top of the head during uneventful rapid bolus intravenous  contrast administration. Coronal and sagittal reformations and 3D post  processing was performed. CT dose reduction was achieved through use of a  standardized protocol tailored for this examination and automatic exposure  control for dose modulation. FINDINGS:  CTA NECK  There is no large pulmonary mass or nodule. Common origin of the innominate left  common carotid arteries. Paraseptal emphysematous change. The left vertebral  artery is dominant. Vertebral artery origins are patent. Atherosclerotic change  of the proximal ICA on the right, left. The bilateral subclavian, common  carotid, and internal carotid arteries are patent with no flow-limiting  stenosis. % of right carotid artery stenosis: 40  % of left carotid artery stenosis: 30  Measurements utilizing NASCET criteria. NASCET method was utilized for calculating stenosis. Left vertebral artery is dominant. . The cervical soft tissues are unremarkable. There are degenerative changes of the cervical spine.     CTA HEAD  The right vertebral artery is diminutive in size with mild multifocal stenoses  of the right vertebral artery. Right vertebral artery largely terminates in  PICA. There is mild stenosis of the V4 segment of the left vertebral artery. There is severe stenosis at the origin of the basilar artery with near occlusive  thrombus present. Cavernous ICAs demonstrate atherosclerotic change. Moderate  supraclinoid ICA stenosis on the right and on the left. Predominant inflow to  the posterior cerebral arteries via a posterior communicating artery on the  right. Retrograde filling of the basilar artery. . M1 segments are patent. Severe  M2 segment stenosis/near occlusive thrombus on the right 385-301 probable small  aneurysm cranially oriented at the anterior division MCA segment on the right. 301-389 3 x 2 mm in size. No marrow image aneurysm is identified. There is  moderate to severe stenosis of the A2 segment on the left. Proximal M2 segments  on the left are patent. .A 2 segments are patent. Symmetric arborization of M2  vessels is demonstrated. The basilar artery is patent. . The M1 segments are  patent bilaterally. .The posterior cerebral arteries on the right and on the left  are patent. .  There is no aneurysm. Central pontine hypodensity is concerning  for pontine infarct. Right frontal hypodensity is concerning for subacute  infarction in the right frontal lobe. Periventricular and scattered foci of  hypodensity elsewhere consistent with moderate chronic microvascular ischemic  change. Impression  Severe stenosis/near occlusion of the basilar artery at its origin. The left  vertebral artery is dominant with a right vertebral artery terminates in PICA  there is retrograde filling of the basilar artery via posterior communicating  artery on the right. Central pontine hypodensity is concerning for acute infarction.   Moderate to severe stenosis proximal M2 anterior division segment with small 3.5  x 2 mm aneurysm associated with this region. Right frontal infarction is subacute. Moderate chronic microvascular ischemic change. Mild cervical atherosclerotic vasculopathy. Mild to moderate stenosis of the supraclinoid ICA on the right.       REVIEWED LABS:  Lab Results   Component Value Date/Time    WBC 7.4 10/07/2021 05:12 AM    HCT 43.1 10/07/2021 05:12 AM    HGB 15.7 10/07/2021 05:12 AM    PLATELET 867 23/11/5125 05:12 AM     Lab Results   Component Value Date/Time    Sodium 137 10/07/2021 05:12 AM    Potassium 4.0 10/07/2021 05:12 AM    Chloride 105 10/07/2021 05:12 AM    CO2 28 10/07/2021 05:12 AM    Glucose 193 (H) 10/07/2021 05:12 AM    BUN 20 10/07/2021 05:12 AM    Creatinine 1.20 10/07/2021 05:12 AM    Calcium 9.3 10/07/2021 05:12 AM     Lab Results   Component Value Date/Time    Vitamin B12 434 04/26/2014 05:35 AM    Folate 6.6 04/26/2014 05:35 AM     Lab Results   Component Value Date/Time    LDL, calculated 96.8 10/08/2021 02:36 AM     Lab Results   Component Value Date/Time    Hemoglobin A1c 5.9 (H) 10/08/2021 02:36 AM

## 2021-10-08 NOTE — PROGRESS NOTES
End of Shift Note    Bedside shift change report given to Piper Howell RN (oncoming nurse) by Liborio Cook RN (offgoing nurse). Report included the following information SBAR and Kardex    Shift worked: 7a-7p   Shift summary and any significant changes:    Pt sleeping soundly and has not been responding to attempts to awaken him. His vital signs are stable. MD made aware. Please advise MD tomorrow if this continues through the night. Concerns for physician to address: As above   Zone phone for oncoming shift:  [phone]     Patient Information  Juancho Romeo  77 y.o.  10/7/2021  4:53 AM by Ignacio Hampton MD. Juancho Romeo was admitted from Home    Problem List  Patient Active Problem List    Diagnosis Date Noted    Nausea and vomiting 05/01/2014    Stroke (cerebrum) ([address] 10/07/2021    Odynophagia 04/26/2014    Anorexia 04/26/2014    Dysphagia 04/25/2014    Hypovolemia 04/25/2014    S/P cardiac catheterization 03/17/2014    Chest pain 03/14/2014    ACS (acute coronary syndrome) ([address] 03/14/2014    CVA (cerebral infarction) 03/14/2014    HTN (hypertension) 03/14/2014    CAD (coronary artery disease) 03/14/2014     Past Medical History:   Diagnosis Date    CAD (coronary artery disease)     Cancer ([address]     prostate    Diabetes ([address]     GERD (gastroesophageal reflux disease)     Heart failure ([address]     cardiac stent    Hyperlipemia     Hypertension     Screen for colon cancer 6/11/2015    Stroke ([address]     Stroke ([address]     2000       Core Measures:  CVA: Yes Yes  CHF:No No  PNA:No No    Activity:  Activity Level: Bed Rest  Number times ambulated in hallways past shift: 0  Number of times OOB to chair past shift: 0    Cardiac:   Cardiac Monitoring: Yes      Cardiac Rhythm: Sinus Rhythm    Access:   Current line(s): PIV   Central Line? No   PICC LINE? No     Genitourinary:   Urinary status: incontinent  Urinary Catheter?  No     Respiratory:   O2 Device: None (Room air)  Chronic home O2 use?: NO  Incentive spirometer at bedside: NO       GI:  Last Bowel Movement Date:  (PTA)  Current diet:  ADULT DIET Regular; 4 carb choices (60 gm/meal)  Passing flatus: NO  Tolerating current diet: NO       Pain Management:   Patient states pain is manageable on current regimen: YES    Skin:  Niraj Score: 15  Interventions: increase time out of bed and nutritional support     Patient Safety:  Fall Score:  Total Score: 3  Interventions: bed/chair alarm  High Fall Risk: Yes    DVT prophylaxis:  DVT prophylaxis Med- Yes  DVT prophylaxis SCD or MARIELOS- No     Wounds: (If Applicable)  Wounds- No  Location     Active Consults:  IP CONSULT TO HOSPITALIST  IP CONSULT TO NEUROLOGY    Length of Stay:  Expected LOS: 2d 0h  Actual LOS: 1  Discharge Plan: BRYAN Trujillo RN

## 2021-10-08 NOTE — PROGRESS NOTES
Hospitalist Progress Note    NAME: Marcelo Mcguire   :  1955   MRN:  [id number]     Interim Hospital Summary: 77 y.o. 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 No intracranial hemorrhage. 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 The left  vertebral artery is dominant with a right vertebral artery terminates in PICA  there is retrograde filling of the basilar artery via posterior communicating  artery on the right. Central pontine hypodensity is concerning for acute infarction. Moderate to severe stenosis proximal M2 anterior division segment with small 3.5  x 2 mm aneurysm associated with this region. Right frontal infarction is subacute. Moderate chronic microvascular ischemic change. Mild cervical atherosclerotic vasculopathy. Mild to moderate stenosis of the supraclinoid ICA on the right. MRI Brain:IMPRESSION  1. Multiple acute to subacute infarcts scattered throughout the right frontal  and parietal lobes (MCA territory), largest in the right middle frontal gyrus. Additional punctate acute to subacute infarcts in the bilateral occipital lobes. 2. Generalized parenchymal volume loss and mild chronic microvascular ischemic  disease. Small chronic infarcts in the left occipital lobe and right justus. -EEG Normal  -Echo EF 55-60%, no AS, trace MR, no shunt seen  -LDL 96  -HgA1c 5.9  -Continue with ASA and Plavix  -patient with AMS due to CVA    CAD  HTN  HLD  BPH  Continue home meds    25.0 - 29.9 Overweight / Body mass index is 28.28 kg/m².     Estimated discharge date: October 10  Barriers:    Code status: Full  Prophylaxis: Lovenox  Recommended Disposition: Home w/Family       Subjective:     Chief Complaint / Reason for Physician Visit  Follow up of CVA, CAD, HTN  Chart reviewed in detail. Discussed with RN events overnight. Updated sister Holly Singletary 10/08    Review of Systems:  Symptom Y/N Comments  Symptom Y/N Comments   Fever/Chills    Chest Pain     Poor Appetite    Edema     Cough    Abdominal Pain     Sputum    Joint Pain     SOB/JANE    Pruritis/Rash     Nausea/vomit    Tolerating PT/OT     Diarrhea    Tolerating Diet     Constipation    Other       Could NOT obtain due to:      PO intake: No data found. Objective:     VITALS:   Last 24hrs VS reviewed since prior progress note. Most recent are:  Patient Vitals for the past 24 hrs:   Temp Pulse Resp BP SpO2   10/08/21 1127 98 °F (36.7 °C) 61 16 (!) 95/53 96 %   10/08/21 0936 98.5 °F (36.9 °C) 81 16 (!) 185/95 99 %   10/08/21 0923    (!) 176/118    10/08/21 0909  78  (!) 200/98    10/08/21 0716    (!) 156/81    10/08/21 0250 97.6 °F (36.4 °C) 69 16 (!) 156/81 99 %   10/07/21 2304 97.9 °F (36.6 °C) 61 16 132/72 100 %   10/07/21 2018 97.5 °F (36.4 °C) 66 18 135/67 100 %   10/07/21 1818    (!) 174/81    10/07/21 1557 97.7 °F (36.5 °C) 74 17 (!) 178/77 100 %   10/07/21 1351 97.5 °F (36.4 °C) 74 17 (!) 168/89 100 %       Intake/Output Summary (Last 24 hours) at 10/8/2021 1222  Last data filed at 10/8/2021 0716  Gross per 24 hour   Intake    Output 200 ml   Net -200 ml        I had a face to face encounter, and independently examined this patient on 10/8/2021, as outlined below:  PHYSICAL EXAM:  General: WD, WN. Not alert. Somnolent  EENT:  Eyes closed  Resp:  CTA bilaterally, no wheezing or rales. No accessory muscle use  CV:  Regular  rhythm,  No edema  GI:  Soft, Non distended, Non tender.   +Bowel sounds  Neurologic:  Non verbal.  Does not respond to voice but withdraws to pain  Psych:   Not anxious nor agitated  Skin:  No rashes. No jaundice    Reviewed most current lab test results and cultures  YES  Reviewed most current radiology test results   YES  Review and summation of old records today    NO  Reviewed patient's current orders and MAR    YES  PMH/SH reviewed - no change compared to H&P  ________________________________________________________________________  Care Plan discussed with:    Comments   Patient x    Family      RN     Care Manager     Consultant                        Multidiciplinary team rounds were held today with , nursing, pharmacist and clinical coordinator. Patient's plan of care was discussed; medications were reviewed and discharge planning was addressed. ________________________________________________________________________  Total NON critical care TIME:   25   Minutes    Total CRITICAL CARE TIME Spent:   Minutes non procedure based      Comments   >50% of visit spent in counseling and coordination of care x     This includes time during multidisciplinary rounds if indicated above   ________________________________________________________________________  Daija Alvarado MD     Procedures: see electronic medical records for all procedures/Xrays and details which were not copied into this note but were reviewed prior to creation of Plan. LABS:  I reviewed today's most current labs and imaging studies.   Pertinent labs include:  Recent Labs     10/07/21  0512   WBC 7.4   HGB 15.7   HCT 43.1        Recent Labs     10/07/21  0512      K 4.0      CO2 28   *   BUN 20   CREA 1.20   CA 9.3   MG 2.0   ALB 3.5   TBILI 0.8   ALT 14

## 2021-10-08 NOTE — PROGRESS NOTES
Pharmacy Medication Reconciliation     The patient's sister was called and was interviewed regarding current PTA medication list, use and drug allergies; The patient's sister was questioned regarding use of any other inhalers, topical products, over the counter medications, herbal medications, vitamin products or ophthalmic/nasal/otic medication use. Allergy Update: Patient has no known allergies. Recommendations/Findings: The following amendments were made to the patient's active medication list on file at [facility]:   1) Additions: None    2) Deletions: Patient sister had current home meds in front of her. Cross-checked with meds dispensed in Rx query  -Dutasteride/Tamsulosin  - Extra order for Acetaminophen  - Atorvastatin 40 mg  - cyanocobalamin  - docusate sodium  - isosorbide dinitrate  - lisinopril 5 mg   - meclizine  - methocarbamol  - duplicate metoprolol order  - omega-3 fatty acids  - pantoprazole  - sertraline  - tamsulosin  - ticagrelor  - lancets and blood glucose strips    3) Changes: None      Pertinent Findings: None    Clarified PTA med list with patient family interview and rx query. PTA medication list was corrected to the following:     Prior to Admission Medications   Prescriptions Last Dose Informant Taking?   acetaminophen (ACETAMINOPHEN EXTRA STRENGTH) 500 mg tablet  Family Member No   Sig: Take 500 mg by mouth every six (6) hours as needed for Pain. Indications: PAIN   albuterol (PROVENTIL HFA, VENTOLIN HFA, PROAIR HFA) 90 mcg/actuation inhaler 9/7/2021 at Unknown time Family Member Yes   Sig: Take 2 Puffs by inhalation every six (6) hours as needed for Wheezing. amLODIPine (NORVASC) 10 mg tablet 9/30/2021 at Unknown time Family Member Yes   Sig: Take 10 mg by mouth daily. aspirin delayed-release 81 mg tablet 9/30/2021 at Unknown time Family Member Yes   Sig: Take 81 mg by mouth daily.    atorvastatin (LIPITOR) 80 mg tablet 9/30/2021 at Unknown time Family Member Yes   Sig: Take 80 mg by mouth daily. clopidogrel (PLAVIX) 75 mg tablet 9/30/2021 at Unknown time Family Member Yes   Sig: Take 75 mg by mouth daily. ergocalciferol (VITAMIN D2) 50,000 unit capsule 9/30/2021 at Unknown time Family Member Yes   Sig: Take 1 Cap by mouth every fourteen (14) days. gabapentin (NEURONTIN) 100 mg capsule Unknown at Unknown time Family Member No   Sig: Take 100 mg by mouth three (3) times daily. lisinopril (PRINIVIL, ZESTRIL) 20 mg tablet  Family Member No   Sig: Take 40 mg by mouth daily. metoprolol (LOPRESSOR) 50 mg tablet Not Taking at Unknown time Family Member No   Sig: Take 50 mg by mouth two (2) times a day. Patient not taking: Reported on 10/7/2021   mirtazapine (REMERON) 15 mg tablet 9/30/2021 at Unknown time Family Member Yes   Sig: Take 15 mg by mouth nightly. nitroglycerin (NITROSTAT) 0.4 mg SL tablet  Family Member No   Sig: by SubLINGual route every five (5) minutes as needed for Chest Pain.       Facility-Administered Medications: None        Thank you,  Izabella Cazares

## 2021-10-08 NOTE — PROGRESS NOTES
0930: Upon assessing the patient, this nurse found the patient holding food in his mouth. Will hold all po meds until SLP evaluates the patient. 0940: SLP at bedside to evaluate patient. PRN hydralazine given for elevated BP.    0947: SLP recommend to give meds crushed in apple sauce. All scheduled oral meds given. 1215: Patient working with PT.    : Bedside and Verbal shift change report given to Mahamed Golden (oncoming nurse) by Kamaljit Vallejo (offgoing nurse). Report included the following information SBAR.

## 2021-10-08 NOTE — PROGRESS NOTES
End of Shift Note    Bedside shift change report given to Inés Gtz  (oncoming nurse) by Mali Vivas RN (offgoing nurse). Report included the following information  Shift worked: 7p-7a     Shift summary and any significant changes:     no significant changes patient rested well     Concerns for physician to address:  no     Zone phone for oncoming shift:  [phone]       Activity:  Activity Level: Bed Rest  Number times ambulated in hallways past shift: {Numbers; 0-5:[id number]}  Number of times OOB to chair past shift: {Numbers; 0-5:[id number]}    Cardiac:   Cardiac Monitoring:  Cardiac Rhythm: Sinus Rhythm    Access:   Current line(s):  Genitourinary:   Urinary status:    Respiratory:   O2 Device: None (Room air)  Chronic home O2 use?: yes  Incentive spirometer at bedside:no  GI:  Last Bowel Movement Date:  (dtr couldnot recall?)  Current diet:  ADULT DIET Regular; 4 carb choices (60 gm/meal)  Passing flatus: yes  Tolerating current diet:yes       Pain Management:   Patient states pain is manageable on current regimen: yes    Skin:  Niraj Score: 17  Interventions: reposition    Patient Safety:  Fall Score:  Total Score: 3  Interventions:yes  : High Fall Risk: Yes    Length of Stay:  Expected LOS:   Actual LOS: 1      Mali Vivas RN

## 2021-10-09 PROBLEM — I65.1 BASILAR ARTERY STENOSIS, SYMPTOMATIC, WITHOUT INFARCTION: Status: ACTIVE | Noted: 2021-01-01

## 2021-10-09 PROBLEM — I63.411 EMBOLIC STROKE INVOLVING RIGHT MIDDLE CEREBRAL ARTERY (HCC): Status: ACTIVE | Noted: 2021-01-01

## 2021-10-09 PROBLEM — I65.23 BILATERAL CAROTID ARTERY STENOSIS: Status: ACTIVE | Noted: 2021-01-01

## 2021-10-09 NOTE — PROGRESS NOTES
Informed Purveyor NP of change in Denny coma scale and the patients current condition. Dr Fabiana Seaman was aware of the patients condition earlier today, but not sure if he was made aware of the coma scale. Asked to follow up with neurology.

## 2021-10-09 NOTE — PROGRESS NOTES
Hospitalist Progress Note    NAME: Yeni Chau   :  1955   MRN:  [id number]     Interim Hospital Summary: 77 y.o. male whom presented on 10/7/2021 with      Assessment / Plan:    Sepsis  Suspect aspiration PNA  -today with fever 101, RR 34, congested, AMS  -CXR  -blood cultures  -lactic acid  -start Zosyn  -sounds congested. Decrease IVF rate pending lactic acid  -NPO    Stroke   Carotid artery stenosis  Basilar artery stenosis, near occlusion at its origin   Moderate to severe stenosis of the proximal M2 with a small 3.5 x 2 mm aneurysm  Hx of CVA in   Head CT: IMPRESSION   Right frontal lobe infarct is late acute versus subacute, new since March. No intracranial hemorrhage. 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 The left  vertebral artery is dominant with a right vertebral artery terminates in PICA  there is retrograde filling of the basilar artery via posterior communicating  artery on the right. Central pontine hypodensity is concerning for acute infarction. Moderate to severe stenosis proximal M2 anterior division segment with small 3.5  x 2 mm aneurysm associated with this region. Right frontal infarction is subacute. Moderate chronic microvascular ischemic change. Mild cervical atherosclerotic vasculopathy. Mild to moderate stenosis of the supraclinoid ICA on the right. MRI Brain:IMPRESSION  1. Multiple acute to subacute infarcts scattered throughout the right frontal  and parietal lobes (MCA territory), largest in the right middle frontal gyrus. Additional punctate acute to subacute infarcts in the bilateral occipital lobes. 2. Generalized parenchymal volume loss and mild chronic microvascular ischemic  disease. Small chronic infarcts in the left occipital lobe and right justus.     -EEG Normal  -Echo EF 55-60%, no AS, trace MR, no shunt seen  -LDL 96  -HgA1c 5.9  -Continue with ASA and Plavix. ASA supp if NPO  -Neurology input appreciated    CAD  HTN  -not safe for PO. Change to IV metoprolol    HLD  BPH    25.0 - 29.9 Overweight / Body mass index is 28.28 kg/m². Estimated discharge date: October 10  Barriers:    Code status: Full  Prophylaxis: Lovenox  Recommended Disposition: Home w/Family       Subjective:     Chief Complaint / Reason for Physician Visit  Follow up of CVA, CAD, HTN  Chart reviewed in detail. Discussed with RN events overnight. Updated sister Rocco Heard 10/08    Review of Systems:  Symptom Y/N Comments  Symptom Y/N Comments   Fever/Chills y   Chest Pain     Poor Appetite    Edema     Cough    Abdominal Pain     Sputum    Joint Pain     SOB/JANE    Pruritis/Rash     Nausea/vomit    Tolerating PT/OT     Diarrhea    Tolerating Diet     Constipation    Other       Could NOT obtain due to:      PO intake: No data found. Objective:     VITALS:   Last 24hrs VS reviewed since prior progress note. Most recent are:  Patient Vitals for the past 24 hrs:   Temp Pulse Resp BP SpO2   10/09/21 1130 (!) 100.6 °F (38.1 °C) 75 (!) 34 (!) 180/90 95 %   10/09/21 0904 (!) 101.2 °F (38.4 °C)       10/09/21 0800 (!) 101.4 °F (38.6 °C) 85 20 (!) 153/73 95 %   10/09/21 0407 99.3 °F (37.4 °C) 89 18 130/66 95 %   10/08/21 2338 98 °F (36.7 °C) 67 19 (!) 120/59 94 %   10/08/21 2003 97.9 °F (36.6 °C) 85 18 (!) 118/56 96 %   10/08/21 1723 98.1 °F (36.7 °C) 70 16 (!) 101/55 97 %   10/08/21 1215  69  (!) 94/53    10/08/21 1213  (!) 59  (!) 81/41    10/08/21 1211  61  (!) 92/53        Intake/Output Summary (Last 24 hours) at 10/9/2021 1143  Last data filed at 10/9/2021 0904  Gross per 24 hour   Intake 730.83 ml   Output    Net 730.83 ml        I had a face to face encounter, and independently examined this patient on 10/9/2021, as outlined below:  PHYSICAL EXAM:  General: WD, WN. Not alert. Somnolent  EENT:  Eyes open  Resp:  CTA bilaterally, no wheezing or rales.   No accessory muscle use  CV:  Regular  rhythm,  No edema  GI:  Soft, Non distended, Non tender. +Bowel sounds  Neurologic:  Awake, LUE and LLE paralysis  Psych:   Not anxious nor agitated  Skin:  No rashes. No jaundice    Reviewed most current lab test results and cultures  YES  Reviewed most current radiology test results   YES  Review and summation of old records today    NO  Reviewed patient's current orders and MAR    YES  PMH/SH reviewed - no change compared to H&P  ________________________________________________________________________  Care Plan discussed with:    Comments   Patient x    Family      RN x    Care Manager     Consultant  x                      Multidiciplinary team rounds were held today with , nursing, pharmacist and clinical coordinator. Patient's plan of care was discussed; medications were reviewed and discharge planning was addressed. ________________________________________________________________________  Total NON critical care TIME:   35   Minutes    Total CRITICAL CARE TIME Spent:   Minutes non procedure based      Comments   >50% of visit spent in counseling and coordination of care x     This includes time during multidisciplinary rounds if indicated above   ________________________________________________________________________  Katey Camilo MD     Procedures: see electronic medical records for all procedures/Xrays and details which were not copied into this note but were reviewed prior to creation of Plan. LABS:  I reviewed today's most current labs and imaging studies.   Pertinent labs include:  Recent Labs     10/07/21  0512   WBC 7.4   HGB 15.7   HCT 43.1        Recent Labs     10/09/21  0325 10/07/21  0512   * 137   K 3.9 4.0    105   CO2 22 28   * 193*   BUN 33* 20   CREA 1.43* 1.20   CA 8.6 9.3   MG  --  2.0   ALB  --  3.5   TBILI  --  0.8   ALT  --  14

## 2021-10-09 NOTE — PROGRESS NOTES
End of Shift Note    Bedside shift change report given to Deon Danielson RN (oncoming nurse) by Whitley Leija RN (offgoing nurse). Report included the following information SBAR and Kardex    Shift worked: 7p to 7a   Shift summary and any significant changes:    Have not been responding to attempts to awaken him. His vital signs are stable. MD made aware. Head CT w/o Contrast ordered for the day. Neuro Consult       Concerns for physician to address: As above   Zone phone for oncoming shift:  [phone]     Patient Information  Bisi Martinez  77 y.o.  10/7/2021  4:53 AM by Norman Parrish MD. Bisi Martinez was admitted from Home    Problem List  Patient Active Problem List    Diagnosis Date Noted    Nausea and vomiting 05/01/2014    Stroke (cerebrum) ([facility] 10/07/2021    Odynophagia 04/26/2014    Anorexia 04/26/2014    Dysphagia 04/25/2014    Hypovolemia 04/25/2014    S/P cardiac catheterization 03/17/2014    Chest pain 03/14/2014    ACS (acute coronary syndrome) ([address] 03/14/2014    CVA (cerebral infarction) 03/14/2014    HTN (hypertension) 03/14/2014    CAD (coronary artery disease) 03/14/2014     Past Medical History:   Diagnosis Date    CAD (coronary artery disease)     Cancer ([address]     prostate    Diabetes ([address]     GERD (gastroesophageal reflux disease)     Heart failure ([address]     cardiac stent    Hyperlipemia     Hypertension     Screen for colon cancer 6/11/2015    Stroke ([address]     Stroke ([address]     2000       Core Measures:  CVA: Yes Yes  CHF:No No  PNA:No No    Activity:  Activity Level: Bed Rest  Number times ambulated in hallways past shift: 0  Number of times OOB to chair past shift: 0    Cardiac:   Cardiac Monitoring: Yes      Cardiac Rhythm: Sinus Rhythm    Access:   Current line(s): PIV   Central Line? No   PICC LINE? No     Genitourinary:   Urinary status: incontinent  Urinary Catheter?  No     Respiratory:   O2 Device: None (Room air)  Chronic home O2 use?: NO  Incentive spirometer at bedside: NO       GI:  Last Bowel Movement Date:  (PTA)  Current diet:  ADULT DIET Regular; 4 carb choices (60 gm/meal)  Passing flatus: NO  Tolerating current diet: NO       Pain Management:   Patient states pain is manageable on current regimen: YES    Skin:  Niraj Score: 15  Interventions: increase time out of bed and nutritional support     Patient Safety:  Fall Score:  Total Score: 2  Interventions: bed/chair alarm  High Fall Risk: Yes    DVT prophylaxis:  DVT prophylaxis Med- Yes  DVT prophylaxis SCD or MARIELOS- No     Wounds: (If Applicable)  Wounds- No  Location     Active Consults:  IP CONSULT TO HOSPITALIST  IP CONSULT TO NEUROLOGY    Length of Stay:  Expected LOS: 2d 0h  Actual LOS: 2  Discharge Plan: BRYAN Penn RN

## 2021-10-09 NOTE — PROGRESS NOTES
Transition of Care Plan:    RUR:14%  Disposition: TBD  Follow up appointments: TBD  DME needed: Own Cane  Transportation at Discharge: [id number] [facility] or means to access home:     has keys    IM Medicare Letter: 2nd IM at discharge   Is patient a BCPI-A Bundle: If yes, was Bundle Letter given?:     Caregiver Contact: Sister Nando Campo [phone]  Discharge Caregiver contacted prior to discharge? CM to contact           Reason for Admission:  Stroke                   Plan for utilizing home health:      TBD    PCP: First and Last name:  Remy Rosenthal MD     Name of Practice:    Are you a current patient: Yes/No:    Approximate date of last visit:    Can you participate in a virtual visit with your PCP:                     Current Advanced Directive/Advance Care Plan: Full Code      Healthcare Decision Maker:   Click here to complete [address] including selection of the Healthcare Decision Maker Relationship (ie \"Primary\")                             Transition of Care Plan:                    TBD      CM spoke with patient mother at bedside and introduced role, verified demographics, and discussed discharge planning. Mother reported was previously independent with ADLs and IADLs but did not drive. Patient lives with his sister Nando Campo in a one level one with 0 DEMETRIO. Patient has no previous history of HH/SNF/Acute Rehab. Mother reports pt. Will need BLS transport at time of discharge. Last PCP visit unknown. Primary CM will continue to follow patient for discharge planning needs and arrange for services as deemed necessary. Care Management Interventions  Mode of Transport at Discharge: BLS  Transition of Care Consult (CM Consult):  (TBD)  Physical Therapy Consult: Yes  Occupational Therapy Consult: Yes  Speech Therapy Consult: Yes  Support Systems: Other Family Member(s)  Confirm Follow Up Transport: Family     Rogelio Alcantara, MSN, RN  Care Manager   ext. Mayda Flynn

## 2021-10-09 NOTE — PROGRESS NOTES
Patient has some midline shift and midline herniation on his CT scan from large ischemic stroke of the right hemisphere. Since this is a nondominant hemisphere lesion we will consult neurosurgery, and see if they recommend hypertonic saline as a treatment for cerebral edema since NIH says it does not really improve the clinical course, and hypertonic saline is preferred over mannitol.   Talk to Dr. Yudith Renae the hospitalist and he agrees with plans will transfer to the ICU if sister wants to keep patient full code, he will call the family

## 2021-10-09 NOTE — PROGRESS NOTES
Neurology Progress Note    Patient ID:  Tobi Phan  [id number]  89 y.o.  1955      CHIEF COMPLAINT: Altered mental status and left-sided weakness    Subjective:      Patient has complaints altered mental status and left-sided weakness. Patient seen yesterday for acute stroke with left-sided weakness, and then lethargy, and patient being less responsive, and was found to have a stroke with multiple areas of infarction scattered throughout the right hemisphere and both occipital lobes, which does raise the question of possible embolic phenomenon on his MRI scan, and the patient on his CTA has severe stenosis or near occlusion of the basilar artery, with the left vertebral artery being dominant in the right vertebral artery terminating in the PICA with retrograde filling of the basilar artery by the posterior communicating artery on the right. Patient had a possibility for acute infarction in the justus, and had moderate to severe stenosis in the proximal M2 segment with a small 3.5 x 2 mm aneurysm at this region also. His right frontal infarction was thought to be subacute. He had in addition diffuse cerebral microvascular disease and stenosis of the supraclinoid internal carotid artery on the right. The patient is now more awake, but still lethargic but able to give his name correctly, and states that he lives with his sister, but not able to give his date of birth. He has a moderate severe left hemiplegia but able to  on the right side and raise his right arm move his right leg better than the left side. Patient on dual antiplatelet therapy with aspirin and Plavix. Patient is lethargic and his swallowing is somewhat borderline. Nurses report some low-grade fever and difficulty swallowing so he is being kept n.p.o. this morning. Patient denies any pain at this time. He is in regular rhythm and has no signs of A. fib so far.     Current Facility-Administered Medications   Medication Dose Route Frequency    [Held by provider] lisinopriL (PRINIVIL, ZESTRIL) tablet 10 mg  10 mg Oral DAILY    metoprolol tartrate (LOPRESSOR) tablet 25 mg  25 mg Oral BID    0.9% sodium chloride infusion  75 mL/hr IntraVENous CONTINUOUS    ondansetron (ZOFRAN) injection 4 mg  4 mg IntraVENous Q4H PRN    hydrALAZINE (APRESOLINE) 20 mg/mL injection 10 mg  10 mg IntraVENous Q6H PRN    aspirin delayed-release tablet 81 mg  81 mg Oral DAILY    atorvastatin (LIPITOR) tablet 40 mg  40 mg Oral DAILY    clopidogreL (PLAVIX) tablet 75 mg  75 mg Oral DAILY    . PHARMACY TO SUBSTITUTE PER PROTOCOL (Reordered from: Dutasteride-Tamsulosin 0.5-0.4 mg CM24)    Per Protocol    acetaminophen (TYLENOL) tablet 650 mg  650 mg Oral Q4H PRN    Or    acetaminophen (TYLENOL) solution 650 mg  650 mg Per NG tube Q4H PRN    Or    acetaminophen (TYLENOL) suppository 650 mg  650 mg Rectal Q4H PRN    enoxaparin (LOVENOX) injection 40 mg  40 mg SubCUTAneous Q24H    insulin lispro (HUMALOG) injection   SubCUTAneous AC&HS    glucose chewable tablet 16 g  4 Tablet Oral PRN    dextrose (D50W) injection syrg 12.5-25 g  12.5-25 g IntraVENous PRN    glucagon (GLUCAGEN) injection 1 mg  1 mg IntraMUSCular PRN        Past Medical History:   Diagnosis Date    CAD (coronary artery disease)     Cancer (HCC)     prostate    Diabetes ([facility]     GERD (gastroesophageal reflux disease)     Heart failure (HCC)     cardiac stent    Hyperlipemia     Hypertension     Screen for colon cancer 6/11/2015    [facility]     Stroke ([facility]     2000       Past Surgical History:   Procedure Laterality Date    [place] CARDIAC SURG PROCEDURE UNLIST      stent       [unfilled]    Social History     Tobacco Use    Smoking status: Never Smoker    Smokeless tobacco: Never Used   Substance Use Topics    Alcohol use: No       Current Facility-Administered Medications   Medication Dose Route Frequency Provider Last Rate Last Admin    [Held by provider] lisinopriL (PRINIVIL, ZESTRIL) tablet 10 mg  10 mg Oral DAILY Asmita Olsen MD        metoprolol tartrate (LOPRESSOR) tablet 25 mg  25 mg Oral BID Asmita Olsen MD        0.9% sodium chloride infusion  75 mL/hr IntraVENous CONTINUOUS Asmita Olsen MD 75 mL/hr at 10/09/21 0902 75 mL/hr at 10/09/21 0902    ondansetron (ZOFRAN) injection 4 mg  4 mg IntraVENous Q4H PRN Stuart Toro MD        hydrALAZINE (APRESOLINE) 20 mg/mL injection 10 mg  10 mg IntraVENous Q6H PRN Stuart Toro MD   10 mg at 10/08/21 5563    aspirin delayed-release tablet 81 mg  81 mg Oral DAILY Stuart Toro MD   81 mg at 10/08/21 0946    atorvastatin (LIPITOR) tablet 40 mg  40 mg Oral DAILY Stuart Toro MD   40 mg at 10/08/21 0947    clopidogreL (PLAVIX) tablet 75 mg  75 mg Oral DAILY Stuart Toro MD   75 mg at 10/08/21 0947    . PHARMACY TO SUBSTITUTE PER PROTOCOL (Reordered from: Dutasteride-Tamsulosin 0.5-0.4 mg CM24)    Per Protocol Stuart Toro MD        acetaminophen (TYLENOL) tablet 650 mg  650 mg Oral Q4H PRN Stuart Toro MD        Or   [facility] acetaminophen (TYLENOL) solution 650 mg  650 mg Per NG tube Q4H PRN Stuart Toro MD        Or    acetaminophen (TYLENOL) suppository 650 mg  650 mg Rectal Q4H PRN Stuart Toro MD   650 mg at 10/09/21 0857    enoxaparin (LOVENOX) injection 40 mg  40 mg SubCUTAneous Q24H Stuart Toro MD   40 mg at 10/08/21 0934    insulin lispro (HUMALOG) injection   SubCUTAneous AC&HS Stuart Toro MD   2 Units at 10/08/21 1709    glucose chewable tablet 16 g  4 Tablet Oral PRN Stuart Toro MD        dextrose (D50W) injection syrg 12.5-25 g  12.5-25 g IntraVENous PRN Stuart Toro MD        glucagon (GLUCAGEN) injection 1 mg  1 mg IntraMUSCular PRN Stuart Toro MD           No Known Allergies    Review of Systems:    Review of systems not obtained due to patient factors.     Objective:    Objective:     Patient Vitals for the past 24 hrs:   BP Temp Pulse Resp SpO2   10/09/21 0904  (!) 101.2 °F (38.4 °C)      10/09/21 0800 (!) 153/73 (!) 101.4 °F (38.6 °C) 85 20 95 %   10/09/21 0407 130/66 99.3 °F (37.4 °C) 89 18 95 %   10/08/21 2338 (!) 120/59 98 °F (36.7 °C) 67 19 94 %   10/08/21 2003 (!) 118/56 97.9 °F (36.6 °C) 85 18 96 %   10/08/21 1723 (!) 101/55 98.1 °F (36.7 °C) 70 16 97 %   10/08/21 1215 (!) 94/53  69     10/08/21 1213 (!) 81/41  (!) 59     10/08/21 1211 (!) 92/53  61     10/08/21 1127 (!) 95/53 98 °F (36.7 °C) 61 16 96 %         Lab Review   Recent Results (from the past 24 hour(s))   GLUCOSE, POC    Collection Time: 10/08/21 11:04 AM   Result Value Ref Range    Glucose (POC) 182 (H) 65 - 117 mg/dL    Performed by Katelynn Beyer (PCT)    GLUCOSE, POC    Collection Time: 10/08/21  5:05 PM   Result Value Ref Range    Glucose (POC) 182 (H) 65 - 117 mg/dL    Performed by Ladan Maravilla RN    GLUCOSE, POC    Collection Time: 10/08/21  9:14 PM   Result Value Ref Range    Glucose (POC) 125 (H) 65 - 117 mg/dL    Performed by [facility]    Collection Time: 10/09/21  3:25 AM   Result Value Ref Range    Sodium 134 (L) 136 - 145 mmol/L    Potassium 3.9 3.5 - 5.1 mmol/L    Chloride 104 97 - 108 mmol/L    CO2 22 21 - 32 mmol/L    Anion gap 8 5 - 15 mmol/L    Glucose 157 (H) 65 - 100 mg/dL    BUN 33 (H) 6 - 20 MG/DL    Creatinine 1.43 (H) 0.70 - 1.30 MG/DL    BUN/Creatinine ratio 23 (H) 12 - 20      GFR est AA 60 (L) >60 ml/min/1.73m2    GFR est non-AA 49 (L) >60 ml/min/1.73m2    Calcium 8.6 8.5 - 10.1 MG/DL   GLUCOSE, POC    Collection Time: 10/09/21  6:37 AM   Result Value Ref Range    Glucose (POC) 172 (H) 65 - 117 mg/dL    Performed by Madeleine Marti RN    GLUCOSE, POC    Collection Time: 10/09/21  7:58 AM   Result Value Ref Range    Glucose (POC) 188 (H) 65 - 117 mg/dL    Performed by Angel Sanders            Additional comments:I personally viewed and interpreted the patient's CTAs and MRIs are reviewed personally on the PACS system.   MRI Results (most recent):  Results from Hospital Encounter encounter on 10/07/21    MRI BRAIN WO CONT    Narrative  EXAM: MRI BRAIN WO CONT    INDICATION: stroke workup    COMPARISON: CTA head neck 10/7/2021. CONTRAST: None. TECHNIQUE:  Multiplanar multisequence acquisition without contrast of the brain. FINDINGS:  Multiple acute to subacute infarcts scattered throughout the right frontal and  parietal lobes involving the cortex and white matter, largest involving the  right middle frontal gyrus. Additional punctate acute to subacute infarcts noted  in the bilateral occipital lobes. Generalized parenchymal volume loss with commensurate dilation of the sulci and  ventricular system. Scattered periventricular and deep white matter T2/FLAIR  hyperintensities, consistent with mild chronic microvascular ischemic disease. Small chronic infarcts in the left occipital lobe and right justus. There is no  acute hemorrhage, extra-axial fluid collection, or mass effect. There is no  cerebellar tonsillar herniation. Expected arterial flow-voids are present. Scattered mucosal thickening in the left frontal sinus, and bilateral ethmoidal  air cells without air-fluid level. The mastoid air cells and middle ears are  clear. The orbital contents are within normal limits. No significant osseous or  scalp lesions are identified. Impression  1. Multiple acute to subacute infarcts scattered throughout the right frontal  and parietal lobes (MCA territory), largest in the right middle frontal gyrus. Additional punctate acute to subacute infarcts in the bilateral occipital lobes. 2. Generalized parenchymal volume loss and mild chronic microvascular ischemic  disease. Small chronic infarcts in the left occipital lobe and right justus. Results from [place] encounter on 10/07/21    MRI BRAIN WO CONT    Narrative  EXAM: MRI BRAIN WO CONT    INDICATION: stroke workup    COMPARISON: CTA head neck 10/7/2021. CONTRAST: None.     TECHNIQUE:  Multiplanar multisequence acquisition without contrast of the brain. FINDINGS:  Multiple acute to subacute infarcts scattered throughout the right frontal and  parietal lobes involving the cortex and white matter, largest involving the  right middle frontal gyrus. Additional punctate acute to subacute infarcts noted  in the bilateral occipital lobes. Generalized parenchymal volume loss with commensurate dilation of the sulci and  ventricular system. Scattered periventricular and deep white matter T2/FLAIR  hyperintensities, consistent with mild chronic microvascular ischemic disease. Small chronic infarcts in the left occipital lobe and right justus. There is no  acute hemorrhage, extra-axial fluid collection, or mass effect. There is no  cerebellar tonsillar herniation. Expected arterial flow-voids are present. Scattered mucosal thickening in the left frontal sinus, and bilateral ethmoidal  air cells without air-fluid level. The mastoid air cells and middle ears are  clear. The orbital contents are within normal limits. No significant osseous or  scalp lesions are identified. Impression  1. Multiple acute to subacute infarcts scattered throughout the right frontal  and parietal lobes (MCA territory), largest in the right middle frontal gyrus. Additional punctate acute to subacute infarcts in the bilateral occipital lobes. 2. Generalized parenchymal volume loss and mild chronic microvascular ischemic  disease. Small chronic infarcts in the left occipital lobe and right justus. NEUROLOGICAL EXAM:    Appearance: The patient is poorly developed and nourished, provides a poor history and is in no acute distress. Mental Status: Oriented to place and person and not the date or the president, cognitive function and fund of knowledge is abnormal. Speech is fluent without aphasia but has moderate dysarthria. Mood and affect appropriate but very lethargic and encephalopathic.    Cranial Nerves:    Unreliable visual garza. Fundi are benign but poorly seen SUE, EOM's full, no nystagmus, no ptosis. Facial sensation is normal. Corneal reflexes are not tested. Facial movement is weak on the left. Hearing is abnormal bilaterally. Palate is midline with normal sternocleidomastoid and trapezius muscles are normal. Tongue is midline. Neck without meningismus or bruits   Motor:  3/5 strength in upper and lower proximal and distal muscles on the right, and strength about 1/5 on the left upper and lower extremity. Normal bulk and tone. No fasciculations. Rapid alternating movement is decreased bilaterally left greater than right   Reflexes:   Deep tendon reflexes 1+/4 and symmetrical.  No babinski or clonus present   Sensory:   Normal to touch, pinprick and temperature and vibration are not testable. DSS is decreased on the left   Gait:  Not testable. Tremor:   No tremor noted. Cerebellar:  Not testable abnormal cerebellar signs present on Romberg, tandem, and finger-nose-finger exam.   Neurovascular:  Normal heart sounds and regular rhythm, peripheral pulses decreased, and no carotid bruits. Assessment:         ICD-10-CM ICD-9-CM    1. Ischemic stroke of frontal lobe ([facility])  I63.9 434.91    2. Chronic ischemic right MCA stroke  I69.30 V12.54    3. Somnolence  R40.0 780.09    4. Bilateral carotid artery stenosis  I65.23 433.10      433.30    5. Basilar artery stenosis  I65.1 433.00      Active Problems:    Stroke (cerebrum) ([facility]) (29/9/8426)      Embolic stroke involving right middle cerebral artery ([address] (10/9/2021)      Bilateral carotid artery stenosis (10/9/2021)      Basilar artery stenosis, symptomatic, without infarction (10/9/2021)        Plan:     Patient with difficult problem of large stroke, and the source is unclear.   He has severe basilar artery disease, and has severe right carotid disease at the siphon and the M2 middle cerebral artery branch but could be sources, and because of the bilateral occipital strokes cardiac emboli are still a likely possibility. It could be they are from his basilar artery disease. Discussed with the hospitalist, will continue antiplatelet therapy since he seems better this morning, repeat his head CT scan to make sure there is no hemorrhagic conversion, and we will have to monitor his need for IV anticoagulation because of his severe intracranial cerebrovascular disease. Very difficult case, will follow carefully with you.       Signed:  Maria Antonia Villeda MD  10/9/2021  11:00 AM    Samantha Garcia MD  [phone]

## 2021-10-09 NOTE — PROGRESS NOTES
Repeat CT head  IMPRESSION  1. Significant interval increase in size of now extensive acute large right  hemispheric infarct predominantly involving the right MCA territory, with mass  effect resulting in 6 mm of leftward midline shift. 2. No evidence of acute hemorrhage. Patient was unresponsive yesterday but today, he is arousable to voice and follows simple commands like moving right arm. Left side remains paralyzed. Patient not able to swallow meds, sounds congested, is febrile 101F, with RR34.    -NPO  -stat CXR. Procal 2. Start zosyn. LA 1.8. Blood cultures  -start mannitol q6 x 4   -follow Osm and Na closely. Goal to keep Osm <320 and Na <150   -repeat CT head in AM    Discussed case and treatment options by phone with Dr Toñito Webb,  [facility], located within [facility] and with [place].    I then met with family (2 sons, 1 DIL and grandson) at bedside. Also called sister, Rocco Heard. Discussed multiple points:   -Stroke has extended, increase ICP, risk of progression, possible demise, significant disability even if he survives, aspiration risk and possible need for artificial feeding   -medical treatment of ICP and need to transfer to [facility] for surgery if swelling or herniation progresses. Still will be left with significant disability due to the large stroke.   -baseline limited QOL and expectation that he will have significantly poor QOL if he survives. Decisions made today:  -change to DNR  -stay at [facility] and try stabilize condition medically. -If condition worsens, family leaning towards comfort care. -will repeat CT head and re evaluate clinically tomorrow. I will call them tomorrow. Jeffy Rivera MD  Hospitalist        I have provided 65 minutes of critical care time. During this entire length of time I was immediately available to the patient.       The reason for providing this level of medical care was due to a critical illness that impaired one or more vital organ systems, such that there was a high probability of imminent or life threatening deterioration in the patient's condition. This care involved high complexity decision making which includes reviewing the patient's past medical records, current laboratory results, and actual Xray films in order to assess, support vital system function, and to treat this degree of vital organ system failure, and to prevent further life threatening deterioration of the patients condition.

## 2021-10-09 NOTE — PROGRESS NOTES
Patient lethargic; in and out of responsiveness; waxing/waining. Able to lift RUE at times; BLE withdraws to painful stimuli; LUE flacid. Febrile this AM, medicated with tylenol suppository. NSR on monitor, BP elevated; medicated with lopressor IV as ordered, NPO for now. Congested, weak cough, concerning for aspiration. Dr. Helen Duckworth notified of patients condition throughout the shift. Head CT repeated, results pending. Blood Cx X2 pending, CXR ordered and pending, abx initiated. Pt voiding via external catheter, nabila clear urine. Family at bedside, updated regarding condition and plan of care. Report provided to Susan Herrera RN; discussion included assessment, medications, kardex and plan of care.

## 2021-10-10 PROBLEM — G93.6 VASOGENIC CEREBRAL EDEMA (HCC): Status: ACTIVE | Noted: 2021-01-01

## 2021-10-10 NOTE — PROGRESS NOTES
Hospitalist Progress Note    NAME: Perla Cleveland   :  1955   MRN:  [id number]     Interim Hospital Summary: 77 y.o. 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 The left  vertebral artery is dominant with a right vertebral artery terminates in PICA  there is retrograde filling of the basilar artery via posterior communicating  artery on the right. Central pontine hypodensity is concerning for acute infarction. Moderate to severe stenosis proximal M2 anterior division segment with small 3.5  x 2 mm aneurysm associated with this region. Right frontal infarction is subacute. Moderate chronic microvascular ischemic change. Mild cervical atherosclerotic vasculopathy. Mild to moderate stenosis of the supraclinoid ICA on the right. MRI Brain:IMPRESSION  1. Multiple acute to subacute infarcts scattered throughout the right frontal  and parietal lobes (MCA territory), largest in the right middle frontal gyrus. Additional punctate acute to subacute infarcts in the bilateral occipital lobes. 2. Generalized parenchymal volume loss and mild chronic microvascular ischemic  disease. Small chronic infarcts in the left occipital lobe and right justus. CT Head 10/10: IMPRESSION  1. Known right MCA infarct with increased edema, right left midline shift, mass effect, and increase effacement of the basilar cisterns.   2.  Scattered foci of hemorrhage throughout the infarct possibly petechial although hemorrhagic transformation is difficult to exclude.     -EEG Normal  -Echo EF 55-60%, no AS, trace MR, no shunt seen  -LDL 96; -HgA1c 5.9  -ASA supp. Holding plavix and statin as not safe for PO intake    -continue mannitol infusion q6 hours  -transfer to PCU for cardene or labetalol drip. Needs better BP control    -discussed with daughter Faith Peguero) today and other family members (10/09 note). They understand that his near term prognosis is grim and that these medial measures may not halt the progression of his brain herniation and patient may ultimately succumb to the aggregate burden of all his acute neurologic problems. They understand that even if he is to survive this insult, he will be left with significant disability from his neurologic deficits and that his overall quality of life will be very poor. They do not want to pursue surgery at this time and want to focus on comfort if he continues to decline.    -Will repeat CT in AM and re evaluate.  -discussed with Dr Gabi Sánchez at bedside    Sepsis due to aspiration PNA  -10/09 fever 101, RR 34, congested, AMS with CXR New bilateral airspace disease, right greater than left  -blood cultures  -continue zosyn  -NPO. Hold po meds    CAD  HTN  -not safe for PO. Need drip for BP control    HLD  BPH  Hx of CVA in 2000  25.0 - 29.9 Overweight / Body mass index is 28.28 kg/m². Estimated discharge date: October 10  Barriers:    Code status: Full  Prophylaxis: Lovenox  Recommended Disposition: Home w/Family       Subjective:     Chief Complaint / Reason for Physician Visit  Follow up of CVA, CAD, HTN  Chart reviewed in detail. Discussed with RN events overnight.      Updated sister Consuelo Russo 10/08    Review of Systems:  Symptom Y/N Comments  Symptom Y/N Comments   Fever/Chills y   Chest Pain     Poor Appetite    Edema     Cough    Abdominal Pain     Sputum    Joint Pain     SOB/JANE    Pruritis/Rash     Nausea/vomit    Tolerating PT/OT     Diarrhea    Tolerating Diet     Constipation    Other       Could NOT obtain due to:      PO intake: No data found.  Objective:     VITALS:   Last 24hrs VS reviewed since prior progress note. Most recent are:  Patient Vitals for the past 24 hrs:   Temp Pulse Resp BP SpO2   10/10/21 0821 98.3 °F (36.8 °C) 80 30 (!) 198/106 92 %   10/10/21 0608 98.5 °F (36.9 °C) 94 (!) 36 (!) 202/105 95 %   10/10/21 0340 99 °F (37.2 °C) 94 24 (!) 173/92 93 %   10/10/21 0221  81  (!) 204/96    10/10/21 0101  85  (!) 185/96    10/09/21 2335 98.3 °F (36.8 °C) 95 24 (!) 185/108 94 %   10/09/21 1929 98.8 °F (37.1 °C) 88 22 (!) 190/90 91 %   10/09/21 1844 98 °F (36.7 °C) 71 (!) 42 (!) 175/98 97 %   10/09/21 1809    (!) 187/87    10/09/21 1804  74  (!) 196/88 97 %   10/09/21 1537 98.7 °F (37.1 °C) 74 (!) 32 (!) 181/94 98 %   10/09/21 1130 (!) 100.6 °F (38.1 °C) 75 (!) 34 (!) 180/90 95 %       Intake/Output Summary (Last 24 hours) at 10/10/2021 0942  Last data filed at 10/10/2021 1278  Gross per 24 hour   Intake 900.83 ml   Output 2550 ml   Net -1649.17 ml        I had a face to face encounter, and independently examined this patient on 10/10/2021, as outlined below:  PHYSICAL EXAM:  General: WD, WN. Not alert. Somnolent  EENT:  Eyes open  Resp:  CTA bilaterally, no wheezing or rales. No accessory muscle use  CV:  Regular  rhythm,  No edema  GI:  Soft, Non distended, Non tender. +Bowel sounds  Neurologic:  Awake, LUE and LLE paralysis  Psych:   Not anxious nor agitated  Skin:  No rashes.   No jaundice    Reviewed most current lab test results and cultures  YES  Reviewed most current radiology test results   YES  Review and summation of old records today    NO  Reviewed patient's current orders and MAR    YES  PMH/SH reviewed - no change compared to H&P  ________________________________________________________________________  Care Plan discussed with:    Comments   Patient x    Family      RN x    Care Manager     Consultant  x                      Multidiciplinary team rounds were held today with , nursing, pharmacist and clinical coordinator. Patient's plan of care was discussed; medications were reviewed and discharge planning was addressed. ________________________________________________________________________  Total NON critical care TIME:      Minutes    Total CRITICAL CARE TIME Spent:  45  Minutes non procedure based  I have provided     45   minutes of critical care time. During this entire length of time I was immediately available to the patient. The reason for providing this level of medical care was due to a critical illness that impaired one or more vital organ systems, such that there was a high probability of imminent or life threatening deterioration in the patient's condition. This care involved high complexity decision making which includes reviewing the patient's past medical records, current laboratory results, and actual Xray films in order to assess, support vital system function, and to treat this degree of vital organ system failure, and to prevent further life threatening deterioration of the patients condition. Comments   >50% of visit spent in counseling and coordination of care x     This includes time during multidisciplinary rounds if indicated above   ________________________________________________________________________  Sergey Lawton MD     Procedures: see electronic medical records for all procedures/Xrays and details which were not copied into this note but were reviewed prior to creation of Plan. LABS:  I reviewed today's most current labs and imaging studies.   Pertinent labs include:  Recent Labs     10/10/21  0457   WBC 11.5*   HGB 16.9   HCT 45.4        Recent Labs     10/10/21  0458 10/09/21  0325   * 134*   K 3.4* 3.9    104   CO2 22 22   * 157*   BUN 32* 33*   CREA 1.35* 1.43*   CA 9.6 8.6   MG 2.0  --    ALB 2.8*  --    TBILI 2.0*  --    ALT 18  --

## 2021-10-10 NOTE — PROGRESS NOTES
Neurology Progress Note    Patient ID:  Garth Simmonds  [id number]  14 y.o.  1955      CHIEF COMPLAINT: Altered mental status and left-sided weakness    Subjective:      Patient has complaints altered mental status and left-sided weakness. Patient had a CT scan this morning shows slight progression of disease, and probably some mild progression of his tentorial herniation, and the patient's family has decided they want no surgery because in general surgery will prolong the chance of life, but still leave him severely disabled. We will try mannitol for edema, and control his blood pressure too high normal range to allow cerebral perfusion. Patient lethargic, does  with his right hand, but does not really speak anymore and other than squeeze on command he does not do much else. We will continue medical management the best we can, have discussed with neurosurgery and the hospitalist.  Also nursing staff. Patient has multiple embolic strokes in the brain of questionable etiology. Patient was found to have a stroke with multiple areas of infarction scattered throughout the right hemisphere and both occipital lobes, which does raise the question of possible embolic phenomenon on his MRI scan, and the patient on his CTA has severe stenosis or near occlusion of the basilar artery, with the left vertebral artery being dominant in the right vertebral artery terminating in the PICA with retrograde filling of the basilar artery by the posterior communicating artery on the right. Patient had moderate to severe stenosis in the proximal M2 segment with a small 3.5 x 2 mm aneurysm at this region also. His right frontal infarction was thought to be subacute. He had in addition diffuse cerebral microvascular disease and stenosis of the supraclinoid internal carotid artery on the right. Patient is lethargic and his swallowing is poor and he probably has bilateral aspiration pneumonia.   Nurses report some low-grade fever and difficulty swallowing so he is being kept n.p.o. this morning. Patient denies any pain at this time. He is in regular rhythm and has no signs of A. fib so far.     Current Facility-Administered Medications   Medication Dose Route Frequency    hydrALAZINE (APRESOLINE) 20 mg/mL injection 20 mg  20 mg IntraVENous NOW    mannitol (OSMITROL) 20 % infusion 21.1 g  0.25 g/kg IntraVENous Q6H    niCARdipine (CARDENE) 25 mg in 0.9% sodium chloride 250 mL infusion  0-15 mg/hr IntraVENous TITRATE    [Held by provider] lisinopriL (PRINIVIL, ZESTRIL) tablet 10 mg  10 mg Oral DAILY    [Held by provider] metoprolol tartrate (LOPRESSOR) tablet 25 mg  25 mg Oral BID    metoprolol (LOPRESSOR) injection 5 mg  5 mg IntraVENous Q6H    piperacillin-tazobactam (ZOSYN) 3.375 g in 0.9% sodium chloride (MBP/ADV) 100 mL MBP  3.375 g IntraVENous Q8H    aspirin (ASA) suppository 300 mg  300 mg Rectal DAILY    albuterol-ipratropium (DUO-NEB) 2.5 MG-0.5 MG/3 ML  3 mL Nebulization Q4H PRN    0.9% sodium chloride infusion  25 mL/hr IntraVENous CONTINUOUS    ondansetron (ZOFRAN) injection 4 mg  4 mg IntraVENous Q4H PRN    hydrALAZINE (APRESOLINE) 20 mg/mL injection 10 mg  10 mg IntraVENous Q6H PRN    [Held by provider] aspirin delayed-release tablet 81 mg  81 mg Oral DAILY    [Held by provider] atorvastatin (LIPITOR) tablet 40 mg  40 mg Oral DAILY    [Held by provider] clopidogreL (PLAVIX) tablet 75 mg  75 mg Oral DAILY    acetaminophen (TYLENOL) tablet 650 mg  650 mg Oral Q4H PRN    Or    acetaminophen (TYLENOL) solution 650 mg  650 mg Per NG tube Q4H PRN    Or    acetaminophen (TYLENOL) suppository 650 mg  650 mg Rectal Q4H PRN    [Held by provider] enoxaparin (LOVENOX) injection 40 mg  40 mg SubCUTAneous Q24H    insulin lispro (HUMALOG) injection   SubCUTAneous AC&HS    glucose chewable tablet 16 g  4 Tablet Oral PRN    dextrose (D50W) injection syrg 12.5-25 g  12.5-25 g IntraVENous PRN    glucagon (GLUCAGEN) injection 1 mg  1 mg IntraMUSCular PRN        Past Medical History:   Diagnosis Date    CAD (coronary artery disease)     Cancer ([facility]     prostate    Diabetes ([facility]     GERD (gastroesophageal reflux disease)     Heart failure (HCC)     cardiac stent    Hyperlipemia     Hypertension     Screen for colon cancer 6/11/2015    Stroke ([facility]     St[facility] ([facility]     2000       Past Surgical History:   Procedure Laterality Date    OK CARDIAC SURG PROCEDURE UNLIST      stent       [unfilled]    Social History     Tobacco Use    Smoking status: Never Smoker    Smokeless tobacco: Never Used   Substance Use Topics    Alcohol use: No       Current Facility-Administered Medications   Medication Dose Route Frequency Provider Last Rate Last Admin    hydrALAZINE (APRESOLINE) 20 mg/mL injection 20 mg  20 mg IntraVENous NOW Crista Ho ACNP        mannitol (OSMITROL) 20 % infusion 21.1 g  0.25 g/kg IntraVENous Q6H Lynnette Diana MD        niCARdipine (CARDENE) 25 mg in 0.9% sodium chloride 250 mL infusion  0-15 mg/hr IntraVENous TITRATE Lynnette Diana MD 75 mL/hr at 10/10/21 1446 7.5 mg/hr at 10/10/21 1446    [Held by provider] lisinopriL (PRINIVIL, ZESTRIL) tablet 10 mg  10 mg Oral DAILY Lynnette Diana MD        [Held by provider] metoprolol tartrate (LOPRESSOR) tablet 25 mg  25 mg Oral BID Lynnette Diana MD        metoprolol (LOPRESSOR) injection 5 mg  5 mg IntraVENous Q6H Lynnette Diana MD   5 mg at 10/10/21 1334    piperacillin-tazobactam (ZOSYN) 3.375 g in 0.9% sodium chloride (MBP/ADV) 100 mL MBP  3.375 g IntraVENous Q8H Lynnette Diana MD 25 mL/hr at 10/10/21 0949 3.375 g at 10/10/21 0949    aspirin (ASA) suppository 300 mg  300 mg Rectal DAILY Lynnette Diana MD   300 mg at 10/10/21 0949    albuterol-ipratropium (DUO-NEB) 2.5 MG-0.5 MG/3 ML  3 mL Nebulization Q4H PRN Lynnette Diana MD        0.9% sodium chloride infusion  25 mL/hr IntraVENous CONTINUOUS Lynnette Diana MD 25 mL/hr at 10/09/21 1222 25 mL/hr at 10/09/21 1222    ondansetron (ZOFRAN) injection 4 mg  4 mg IntraVENous Q4H PRN Kwasi Dougherty MD        hydrALAZINE (APRESOLINE) 20 mg/mL injection 10 mg  10 mg IntraVENous Q6H PRN Kwsai Dougherty MD   10 mg at 10/10/21 0225    [Held by provider] aspirin delayed-release tablet 81 mg  81 mg Oral DAILY Kwasi Dougherty MD   81 mg at 10/08/21 0946    [Held by provider] atorvastatin (LIPITOR) tablet 40 mg  40 mg Oral DAILY Kwasi Dougherty MD   40 mg at 10/08/21 0947    [Held by provider] clopidogreL (PLAVIX) tablet 75 mg  75 mg Oral DAILY Kwasi Dougherty MD   75 mg at 10/08/21 0947    acetaminophen (TYLENOL) tablet 650 mg  650 mg Oral Q4H PRN Kwasi Dougherty MD        Or   Courtney Howell acetaminophen (TYLENOL) solution 650 mg  650 mg Per NG tube Q4H PRN Kwasi Dougherty MD        Or   Courtney Howell acetaminophen (TYLENOL) suppository 650 mg  650 mg Rectal Q4H PRN Kwasi Dougherty MD   650 mg at 10/09/21 0857    [Held by provider] enoxaparin (LOVENOX) injection 40 mg  40 mg SubCUTAneous Q24H Kwasi Dougherty MD   40 mg at 10/09/21 1133    insulin lispro (HUMALOG) injection   SubCUTAneous AC&HS Kwasi Dougherty MD   3 Units at 10/09/21 1825    glucose chewable tablet 16 g  4 Tablet Oral PRN Kwasi Dougherty MD        dextrose (D50W) injection syrg 12.5-25 g  12.5-25 g IntraVENous PRN Kwasi Dougherty MD        glucagon (GLUCAGEN) injection 1 mg  1 mg IntraMUSCular PRN Kwasi Dougherty MD           No Known Allergies    Review of Systems:    Review of systems not obtained due to patient factors.     Objective:    Objective:     Patient Vitals for the past 24 hrs:   BP Temp Pulse Resp SpO2   10/10/21 1446 (!) 167/98  (!) 101     10/10/21 1334 (!) 212/129  (!) 111     10/10/21 1320 (!) 207/138  100     10/10/21 1231 (!) 207/138 98 °F (36.7 °C) 99 (!) 46 92 %   10/10/21 1139 (!) 203/106  96 (!) 50 96 %   10/10/21 0821 (!) 198/106 98.3 °F (36.8 °C) 80 30 92 %   10/10/21 0704     95 %   10/10/21 0608 (!) 202/105 98.5 °F (36.9 °C) 94 (!) 36 95 % 10/10/21 0340 (!) 173/92 99 °F (37.2 °C) 94 24 93 %   10/10/21 0221 (!) 204/96  81     10/10/21 0101 (!) 185/96  85     10/09/21 2335 (!) 185/108 98.3 °F (36.8 °C) 95 24 94 %   10/09/21 1929 (!) 190/90 98.8 °F (37.1 °C) 88 22 91 %   10/09/21 1844 (!) 175/98 98 °F (36.7 °C) 71 (!) 42 97 %   10/09/21 1809 (!) 187/87       10/09/21 1804 (!) 196/88  74  97 %   10/09/21 1537 (!) 181/94 98.7 °F (37.1 °C) 74 (!) 32 98 %         Lab Review   Recent Results (from the past 24 hour(s))   GLUCOSE, POC    Collection Time: 10/09/21  5:08 PM   Result Value Ref Range    Glucose (POC) 203 (H) 65 - 117 mg/dL    Performed by Aylin Devlin    GLUCOSE, POC    Collection Time: 10/09/21  9:15 PM   Result Value Ref Range    Glucose (POC) 187 (H) 65 - 117 mg/dL    Performed by Asha Lyn RN    CBC WITH AUTOMATED DIFF    Collection Time: 10/10/21  4:57 AM   Result Value Ref Range    WBC 11.5 (H) 4.1 - 11.1 K/uL    RBC 5.39 4. 10 - 5.70 M/uL    HGB 16.9 12.1 - 17.0 g/dL    HCT 45.4 36.6 - 50.3 %    MCV 84.2 80.0 - 99.0 FL    MCH 31.4 26.0 - 34.0 PG    MCHC 37.2 (H) 30.0 - 36.5 g/dL    RDW 14.0 11.5 - 14.5 %    PLATELET 085 007 - 230 K/uL    MPV 9.5 8.9 - 12.9 FL    NRBC 0.0 0  WBC    ABSOLUTE NRBC 0.00 0.00 - 0.01 K/uL    NEUTROPHILS 81 (H) 32 - 75 %    BAND NEUTROPHILS 13 %    LYMPHOCYTES 3 (L) 12 - 49 %    MONOCYTES 3 (L) 5 - 13 %    EOSINOPHILS 0 0 - 7 %    BASOPHILS 0 0 - 1 %    IMMATURE GRANULOCYTES 0 0.0 - 0.5 %    ABS. NEUTROPHILS 10.9 (H) 1.8 - 8.0 K/UL    ABS. LYMPHOCYTES 0.3 (L) 0.8 - 3.5 K/UL    ABS. MONOCYTES 0.3 0.0 - 1.0 K/UL    ABS. EOSINOPHILS 0.0 0.0 - 0.4 K/UL    ABS. BASOPHILS 0.0 0.0 - 0.1 K/UL    ABS. IMM.  GRANS. 0.0 0.00 - 0.04 K/UL    DF MANUAL      RBC COMMENTS NORMOCYTIC, NORMOCHROMIC     LACTIC ACID    Collection Time: 10/10/21  4:58 AM   Result Value Ref Range    Lactic acid 1.9 0.4 - 2.0 MMOL/L   HOMOCYSTEINE, PLASMA    Collection Time: 10/10/21  4:58 AM   Result Value Ref Range Homocysteine, plasma 25.9 (H) 3.7 - 13.9 umol/L   FOLATE    Collection Time: 10/10/21  4:58 AM   Result Value Ref Range    Folate 6.9 5.0 - 21.0 ng/mL   VITAMIN B12    Collection Time: 10/10/21  4:58 AM   Result Value Ref Range    Vitamin B12 192 (L) 193 - 986 pg/mL   SED RATE (ESR)    Collection Time: 10/10/21  4:58 AM   Result Value Ref Range    Sed rate, automated 24 (H) 0 - 20 mm/hr   VITAMIN D, 25 HYDROXY    Collection Time: 10/10/21  4:58 AM   Result Value Ref Range    Vitamin D 25-Hydroxy 18.2 (L) 30 - 872 ng/mL   METABOLIC PANEL, COMPREHENSIVE    Collection Time: 10/10/21  4:58 AM   Result Value Ref Range    Sodium 135 (L) 136 - 145 mmol/L    Potassium 3.4 (L) 3.5 - 5.1 mmol/L    Chloride 104 97 - 108 mmol/L    CO2 22 21 - 32 mmol/L    Anion gap 9 5 - 15 mmol/L    Glucose 244 (H) 65 - 100 mg/dL    BUN 32 (H) 6 - 20 MG/DL    Creatinine 1.35 (H) 0.70 - 1.30 MG/DL    BUN/Creatinine ratio 24 (H) 12 - 20      GFR est AA >60 >60 ml/min/1.73m2    GFR est non-AA 53 (L) >60 ml/min/1.73m2    Calcium 9.6 8.5 - 10.1 MG/DL    Bilirubin, total 2.0 (H) 0.2 - 1.0 MG/DL    ALT (SGPT) 18 12 - 78 U/L    AST (SGOT) 33 15 - 37 U/L    Alk. phosphatase 102 45 - 117 U/L    Protein, total 7.7 6.4 - 8.2 g/dL    Albumin 2.8 (L) 3.5 - 5.0 g/dL    Globulin 4.9 (H) 2.0 - 4.0 g/dL    A-G Ratio 0.6 (L) 1.1 - 2.2     MAGNESIUM    Collection Time: 10/10/21  4:58 AM   Result Value Ref Range    Magnesium 2.0 1.6 - 2.4 mg/dL   OSMOLALITY, SERUM/PLASMA    Collection Time: 10/10/21  4:58 AM   Result Value Ref Range    Osmolality, serum/plasma 313 mOsm/kg H2O   TSH 3RD GENERATION    Collection Time: 10/10/21  4:58 AM   Result Value Ref Range    TSH 0.66 0.36 - 3.74 uIU/mL   SAMPLES BEING HELD    Collection Time: 10/10/21  4:58 AM   Result Value Ref Range    SAMPLES BEING HELD SST     COMMENT        Add-on orders for these samples will be processed based on acceptable specimen integrity and analyte stability, which may vary by analyte.    GLUCOSE, POC    Collection Time: 10/10/21  8:06 AM   Result Value Ref Range    Glucose (POC) 232 (H) 65 - 117 mg/dL    Performed by Seward Fix    GLUCOSE, POC    Collection Time: 10/10/21 11:41 AM   Result Value Ref Range    Glucose (POC) 225 (H) 65 - 117 mg/dL    Performed by Angel Fix            Additional comments:I personally viewed and interpreted the patient's CTAs and MRIs are reviewed personally on the PACS system. MRI Results (most recent):  Results from [facility] encounter on 10/07/21    MRI BRAIN WO CONT    Narrative  EXAM: MRI BRAIN WO CONT    INDICATION: stroke workup    COMPARISON: CTA head neck 10/7/2021. CONTRAST: None. TECHNIQUE:  Multiplanar multisequence acquisition without contrast of the brain. FINDINGS:  Multiple acute to subacute infarcts scattered throughout the right frontal and  parietal lobes involving the cortex and white matter, largest involving the  right middle frontal gyrus. Additional punctate acute to subacute infarcts noted  in the bilateral occipital lobes. Generalized parenchymal volume loss with commensurate dilation of the sulci and  ventricular system. Scattered periventricular and deep white matter T2/FLAIR  hyperintensities, consistent with mild chronic microvascular ischemic disease. Small chronic infarcts in the left occipital lobe and right justus. There is no  acute hemorrhage, extra-axial fluid collection, or mass effect. There is no  cerebellar tonsillar herniation. Expected arterial flow-voids are present. Scattered mucosal thickening in the left frontal sinus, and bilateral ethmoidal  air cells without air-fluid level. The mastoid air cells and middle ears are  clear. The orbital contents are within normal limits. No significant osseous or  scalp lesions are identified. Impression  1.  Multiple acute to subacute infarcts scattered throughout the right frontal  and parietal lobes (MCA territory), largest in the right middle frontal gyrus. Additional punctate acute to subacute infarcts in the bilateral occipital lobes. 2. Generalized parenchymal volume loss and mild chronic microvascular ischemic  disease. Small chronic infarcts in the left occipital lobe and right justus. Results from [place] encounter on 10/07/21    MRI BRAIN WO CONT    Narrative  EXAM: MRI BRAIN WO CONT    INDICATION: stroke workup    COMPARISON: CTA head neck 10/7/2021. CONTRAST: None. TECHNIQUE:  Multiplanar multisequence acquisition without contrast of the brain. FINDINGS:  Multiple acute to subacute infarcts scattered throughout the right frontal and  parietal lobes involving the cortex and white matter, largest involving the  right middle frontal gyrus. Additional punctate acute to subacute infarcts noted  in the bilateral occipital lobes. Generalized parenchymal volume loss with commensurate dilation of the sulci and  ventricular system. Scattered periventricular and deep white matter T2/FLAIR  hyperintensities, consistent with mild chronic microvascular ischemic disease. Small chronic infarcts in the left occipital lobe and right justus. There is no  acute hemorrhage, extra-axial fluid collection, or mass effect. There is no  cerebellar tonsillar herniation. Expected arterial flow-voids are present. Scattered mucosal thickening in the left frontal sinus, and bilateral ethmoidal  air cells without air-fluid level. The mastoid air cells and middle ears are  clear. The orbital contents are within normal limits. No significant osseous or  scalp lesions are identified. Impression  1. Multiple acute to subacute infarcts scattered throughout the right frontal  and parietal lobes (MCA territory), largest in the right middle frontal gyrus. Additional punctate acute to subacute infarcts in the bilateral occipital lobes. 2. Generalized parenchymal volume loss and mild chronic microvascular ischemic  disease.  Small chronic infarcts in the left occipital lobe and right justus. NEUROLOGICAL EXAM:    Appearance: The patient is poorly developed and nourished, provides a poor history and is in no acute distress. Mental Status:  Patient will follow occasional simple command, but very lethargic and obtunded, appropriate but very lethargic and encephalopathic. Cranial Nerves:    Unreliable visual fields. Fundi are benign but poorly seen SUE, EOM's full, no nystagmus, no ptosis. Facial sensation is normal. Corneal reflexes are not tested. Facial movement is weak on the left. Hearing is abnormal bilaterally. Palate is midline with normal sternocleidomastoid and trapezius muscles are normal. Tongue is midline. Neck without meningismus or bruits   Motor:  2/5 strength in upper and lower proximal and distal muscles on the right, and strength about 0/5 on the left upper and lower extremity. Normal bulk and tone. No fasciculations. Rapid alternating movement is decreased bilaterally left greater than right   Reflexes:   Deep tendon reflexes 1+/4 and symmetrical.  No babinski or clonus present   Sensory:   Normal to touch, pinprick and temperature and vibration are not testable. DSS is not testable   Gait:  Not testable. Tremor:   No tremor noted. Cerebellar:  Not testable abnormal cerebellar signs present on Romberg, tandem, and finger-nose-finger exam.   Neurovascular:  Normal heart sounds and regular rhythm, peripheral pulses decreased, and no carotid bruits. Assessment:         ICD-10-CM ICD-9-CM    1. Ischemic stroke of frontal lobe ([facility])  I63.9 434.91    2. Chronic ischemic right MCA stroke  I69.30 V12.54    3. Somnolence  R40.0 780.09    4. Bilateral carotid artery stenosis  I65.23 433.10      433.30    5.  Basilar artery stenosis  I65.1 433.00      Active Problems:    Stroke (cerebrum) ([facility]) (19/4/3912)      Embolic stroke involving right middle cerebral artery ([address] (10/9/2021)      Bilateral carotid artery stenosis (10/9/2021) Basilar artery stenosis, symptomatic, without infarction (10/9/2021)        Plan:     Patient with progression of his cerebral edema, have talked to neurosurgeon the hospitalist, and the family desires to hold on surgery because it increases the chance of survival but not his recovery, and he will remain severely disabled we will try medical management controlling the blood pressure in the high range to allow cerebral perfusion and starting mannitol to try to prevent cerebral edema. Patient with difficult problem of large stroke, and the source is unclear. He has severe basilar artery disease, and has severe right carotid disease at the siphon and the M2 middle cerebral artery branch but could be sources, and because of the bilateral occipital strokes cardiac emboli are still a likely possibility. It could be they are from his basilar artery disease. Discussed with the hospitalist, and neurosurgery. Very difficult case, prognosis here is very poor  Family has decided on DNR status in addition. We will repeat CT in the morning.     Signed:  Aleida Gaston MD  10/10/2021  11:00 AM    Briseida Flores MD  [phone]

## 2021-10-10 NOTE — PROGRESS NOTES
Received message from patient's nurse stating:    Patient admitted for Stroke on 10/7/21. Hx of stroke, hyperlipidemia, CAD, HTN, DM. HF. Patient had been unresponsive last night, but V/S were stable. per Dr. Chepe Woodall, CT w/o contrast was ordered for today. Per CT, stroke has extended. Vital signs on Shift assessment, T. 98.8 (oral), HR 88, Resp 22, o2 91, /90. Patient unresponsive to voice, withdraws from touch/pain. Crackles, Bubbling heard on auscultation. Discussion / orders:    Patient's attending provider is aware of repeat CT results and has spoken with patient's family today. Per attending physician note: \"Decisions made today:  -change to DNR  -stay at [facility] and try stabilize condition medically. -If condition worsens, family leaning towards comfort care. -will repeat CT head and re evaluate clinically tomorrow. I will call them tomorrow. \"    CXR Results  (Last 48 hours)               10/09/21 1921  XR CHEST PORT Final result    Impression:  New bilateral airspace disease, right greater than left, may   represent aspiration or pneumonia. Radiographic follow-up is recommended to   assure resolution. Narrative:  INDICATION: Congested fever. Concern for aspiration. COMPARISON: 10/7/2021       FINDINGS: AP portable imaging of the chest performed at 7:13 PM demonstrates a   stable cardiomediastinal silhouette. There is new bilateral airspace disease,   right greater than left. No pleural effusion or pneumothorax is evident. No   significant osseous abnormalities are seen. Patient has been started on Zosyn    · Lasix 40 mg IV x1           Please note that this note was dictated using Dragon computer voice recognition software. Quite often unanticipated grammatical, syntax, homophones, and other interpretive errors are inadvertently transcribed by the computer software. Please disregard these errors.   Please excuse any errors that have escaped final proofreading.

## 2021-10-10 NOTE — PROGRESS NOTES
Rapid response called on patient at [phone] for a MEWS of 5. Elevated R 36, and /105. Respirations had been increasing as the night progressed as well as his BP. NP Ludwigor asked me to contact and inform neurologist Dr Aura Portillo. He ordered another CT without contrast and a chest xray. Also requested to consult neurosurgery. Spoke with neurosurgeon and he requested to speak with neurology. Gave Dr Aura Bryant's phone number.

## 2021-10-10 NOTE — PROGRESS NOTES
End of Shift Note    Bedside shift change report given to Alvarado Tilley (oncoming nurse) by Sofia Kwong RN (offgoing nurse). Report included the following information SBAR and Kardex    Shift worked: 7p to 7a   Shift summary and any significant changes:    RR called. BP and Respirations elevated. CT without Contrast ordered. Chest Xray ordered. Concerns for physician to address: As above   Zone phone for oncoming shift:  [phone]     Patient Information  Addie Larson  77 y.o.  10/7/2021  4:53 AM by Vijaya Fraire MD. Addie Larson was admitted from Home    Problem List  Patient Active Problem List    Diagnosis Date Noted    Nausea and vomiting 07/81/0016    Embolic stroke involving right middle cerebral artery ([address] 10/09/2021    Bilateral carotid artery stenosis 10/09/2021    Basilar artery stenosis, symptomatic, without infarction 10/09/2021    Stroke (cerebrum) ([address] 10/07/2021    Odynophagia 04/26/2014    Anorexia 04/26/2014    Dysphagia 04/25/2014    Hypovolemia 04/25/2014    S/P cardiac catheterization 03/17/2014    Chest pain 03/14/2014    ACS (acute coronary syndrome) ([address] 03/14/2014    CVA (cerebral infarction) 03/14/2014    HTN (hypertension) 03/14/2014    CAD (coronary artery disease) 03/14/2014     Past Medical History:   Diagnosis Date    CAD (coronary artery disease)     Cancer ([address]     prostate    Diabetes ([address]     GERD (gastroesophageal reflux disease)     Heart failure ([address]     cardiac stent    Hyperlipemia     Hypertension     Screen for colon cancer 6/11/2015    Stroke ([address]     Stroke ([address]     2000       Core Measures:  CVA: Yes Yes  CHF:No No  PNA:No No    Activity:  Activity Level: Bed Rest  Number times ambulated in hallways past shift: 0  Number of times OOB to chair past shift: 0    Cardiac:   Cardiac Monitoring: Yes      Cardiac Rhythm: Sinus Rhythm    Access:   Current line(s): PIV   Central Line? No   PICC LINE?  No     Genitourinary:   Urinary status: incontinent  Urinary Catheter? No     Respiratory:   O2 Device: None (Room air)  Chronic home O2 use?: NO  Incentive spirometer at bedside: NO       GI:  Last Bowel Movement Date:  (unknown)  Current diet:  ADULT DIET Regular; 4 carb choices (60 gm/meal)  Passing flatus: NO  Tolerating current diet: NO       Pain Management:   Patient states pain is manageable on current regimen: YES    Skin:  Niraj Score: 12  Interventions: increase time out of bed and nutritional support     Patient Safety:  Fall Score:  Total Score: 2  Interventions: bed/chair alarm  High Fall Risk: Yes    DVT prophylaxis:  DVT prophylaxis Med- Yes  DVT prophylaxis SCD or MARIELOS- No     Wounds: (If Applicable)  Wounds- No  Location     Active Consults:  IP CONSULT TO HOSPITALIST  IP CONSULT TO NEUROLOGY  IP CONSULT TO NEUROSURGERY    Length of Stay:  Expected LOS: 2d 0h  Actual LOS: 3  Discharge Plan: BRYAN Todd RN

## 2021-10-11 NOTE — PROGRESS NOTES
Speech path   Will sign off due to patient's decline. His head CT is much worse. Family has decided on Hospice.    Keena Calvillo, SLP

## 2021-10-11 NOTE — PROGRESS NOTES
Spiritual Care Assessment/Progress Note  [facility]      NAME: Ruddy Murrell      MRN: [id number]  AGE: 77 y.o. SEX: male  Rastafari Affiliation: No Mosque   Language: English     10/11/2021     Total Time (in minutes): 7     Spiritual Assessment begun in [facility] through conversation with:         []Patient        [] Family    [] Friend(s)        Reason for Consult: Initial/Spiritual assessment, patient floor     Spiritual beliefs: (Please include comment if needed)     [] Identifies with a amado tradition:         [] Supported by a amado community:            [] Claims no spiritual orientation:           [] Seeking spiritual identity:                [] Adheres to an individual form of spirituality:           [x] Not able to assess:                           Identified resources for coping:      [] Prayer                               [] Music                  [] Guided Imagery     [] Family/friends                 [] Pet visits     [] Devotional reading                         [x] Unknown     [] Other:                                               Interventions offered during this visit: (See comments for more details)    Patient Interventions: Initial visit, Other (comment)           Plan of Care:     [x] Support spiritual and/or cultural needs    [] Support AMD and/or advance care planning process      [] Support grieving process   [] Coordinate Rites and/or Rituals    [] Coordination with community clergy   [] No spiritual needs identified at this time   [] Detailed Plan of Care below (See Comments)  [] Make referral to Music Therapy  [] Make referral to Pet Therapy     [] Make referral to Addiction services  [] Make referral to [facility]  [] Make referral to Spiritual Care Partner  [] No future visits requested        [x] Follow up upon further referrals     Comments: attempted visit on [facility] unit for spiritual assessment.   Chart note indicated hospice consult being placed. No family/friends present. Left a pastoral care note on patient's tray table.       HARLEY Connelly, [facility], Staff [address]    [address] Paging Service  [phone]

## 2021-10-11 NOTE — PROGRESS NOTES
0730: patient O2 at 83% on room air. Placed on NC at 6L O2 only went up to 85%. Placed on 15L non rebreather O2 up to 99%. Weaned to 12L venti mask. MD notified. Spoke with MD regarding q6 labs was instructed to DC  1345: patients O2 dropped to 86% with 12L on venti mask. Placed on 15L nonrebreather. Breath sounds very wet with crackles. HR tachy up to 140's Rapid response evaluated patient at bedside and MD was notified. 1343: PRN morphine given  1347: PRN robinul given    1400: comfort only measures placed. MD repaged about orders to DC cardene drip.  Hospice instructed to place patient on 6L NC for comfort   1401: PRN morphine given    1500: DC cardene drip per MD

## 2021-10-11 NOTE — PROGRESS NOTES
Hospitalist Progress Note    NAME: Choco Ng   :  1955   MRN:  [id number]     Interim Hospital Summary: 77 y.o. 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 The left  vertebral artery is dominant with a right vertebral artery terminates in PICA  there is retrograde filling of the basilar artery via posterior communicating  artery on the right. Central pontine hypodensity is concerning for acute infarction. Moderate to severe stenosis proximal M2 anterior division segment with small 3.5  x 2 mm aneurysm associated with this region. Right frontal infarction is subacute. Moderate chronic microvascular ischemic change. Mild cervical atherosclerotic vasculopathy. Mild to moderate stenosis of the supraclinoid ICA on the right. MRI Brain:IMPRESSION  1. Multiple acute to subacute infarcts scattered throughout the right frontal  and parietal lobes (MCA territory), largest in the right middle frontal gyrus. Additional punctate acute to subacute infarcts in the bilateral occipital lobes. 2. Generalized parenchymal volume loss and mild chronic microvascular ischemic  disease. Small chronic infarcts in the left occipital lobe and right justus. CT Head 10/10: IMPRESSION  1. Known right MCA infarct with increased edema, right left midline shift, mass effect, and increase effacement of the basilar cisterns.   2.  Scattered foci of hemorrhage throughout the infarct possibly petechial although hemorrhagic transformation is difficult to exclude.     CT Head 10/11: IMPRESSION  Increased right left midline shift. Increased hydrocephalus and transependymal edema on the left. Sulcal congestion/petechial/subarachnoid hemorrhage not significantly changed    -EEG Normal  -Echo EF 55-60%, no AS, trace MR, no shunt seen  -LDL 96; -HgA1c 5.9  -ASA supp. Holding plavix and statin as not safe for PO intake  -continue mannitol infusion q6 hours  -BP better with cardene drip but CT head today is worse. Patient is not responsive. -10/10 discussed with daughter Shanon Arreola) today and other family members (10/09 note). They understand that his near term prognosis is grim and that these medial measures may not halt the progression of his brain herniation and patient may ultimately succumb to the aggregate burden of all his acute neurologic problems. They understand that even if he is to survive this insult, he will be left with significant disability from his neurologic deficits and that his overall quality of life will be very poor. They do not want to pursue surgery at this time and want to focus on comfort if he continues to decline. -10/11. Needing 100% NRB. Was 93% on RA this AM.  Now unresponsive and CT showing herniation. Not survivable. Discussed with Neurology. Updated Alexandrea. Will consult Hospice. Acute hypoxic respiratory failure  Sepsis due to aspiration PNA  -10/09 fever 101, RR 34, congested, AMS with CXR New bilateral airspace disease, right greater than left  -blood cultures NG  -continue zosyn  -NPO. Hold po meds  -oxygen prn    CAD  HTN  -not safe for PO. Need drip for BP control    HLD  BPH  Hx of CVA in 2000  25.0 - 29.9 Overweight / Body mass index is 28.28 kg/m². Estimated discharge date: October 10  Barriers:    Code status: Full  Prophylaxis: Lovenox  Recommended Disposition: Home w/Family       Subjective:     Chief Complaint / Reason for Physician Visit  Follow up of CVA, CAD, HTN  Chart reviewed in detail.   Discussed with RN events overnight. Remains unresponsive and now hypoxic     Review of Systems:  Symptom Y/N Comments  Symptom Y/N Comments   Fever/Chills y   Chest Pain     Poor Appetite    Edema     Cough    Abdominal Pain     Sputum    Joint Pain     SOB/JANE    Pruritis/Rash     Nausea/vomit    Tolerating PT/OT     Diarrhea    Tolerating Diet     Constipation    Other       Could NOT obtain due to:      PO intake: No data found. Objective:     VITALS:   Last 24hrs VS reviewed since prior progress note. Most recent are:  Patient Vitals for the past 24 hrs:   Temp Pulse Resp BP SpO2   10/11/21 0220 97.4 °F (36.3 °C) 87 25 127/80 91 %   10/11/21 0115  (!) 103  (!) 140/83    10/10/21 2307 97.3 °F (36.3 °C) 99 30 135/86 91 %   10/10/21 2027 97.2 °F (36.2 °C) 96 (!) 32 (!) 142/93 93 %   10/10/21 2009  (!) 103  (!) 154/90    10/10/21 1831  (!) 106  137/79    10/10/21 1704  (!) 110  (!) 153/95    10/10/21 1602  (!) 109  (!) 151/91    10/10/21 1600  100 (!) 34 (!) 151/91 91 %   10/10/21 1545  (!) 107 (!) 37 (!) 159/92 92 %   10/10/21 1530  90 (!) 37 (!) 150/96 91 %   10/10/21 1515 98.5 °F (36.9 °C) (!) 105 (!) 38 (!) 162/94 92 %   10/10/21 1500  (!) 107 (!) 39 (!) 168/103 92 %   10/10/21 1446  (!) 101 (!) 36 (!) 167/98 92 %   10/10/21 1445  97 (!) 36 (!) 167/98 91 %   10/10/21 1334  (!) 111  (!) 212/129    10/10/21 1320  100  (!) 207/138    10/10/21 1231 98 °F (36.7 °C) 99 (!) 46 (!) 207/138 92 %   10/10/21 1139  96 (!) 50 (!) 203/106 96 %       Intake/Output Summary (Last 24 hours) at 10/11/2021 5283  Last data filed at 10/11/2021 0545  Gross per 24 hour   Intake    Output 300 ml   Net -300 ml        I had a face to face encounter, and independently examined this patient on 10/11/2021, as outlined below:  PHYSICAL EXAM:  General: WD, WN. Not alert. Unresponsive  EENT:  No spontaneous eye opening;  Pupils not reactive but looks equal  Resp:  CTA bilaterally, no wheezing or rales.   No accessory muscle use  CV:  Regular  rhythm,  No edema  GI:  Soft, Non distended, Non tender. +Bowel sounds  Neurologic:  Left flaccid  Psych:   Not anxious nor agitated  Skin:  No rashes. No jaundice    Reviewed most current lab test results and cultures  YES  Reviewed most current radiology test results   YES  Review and summation of old records today    NO  Reviewed patient's current orders and MAR    YES  PMH/SH reviewed - no change compared to H&P  ________________________________________________________________________  Care Plan discussed with:    Comments   Patient     Family  x    RN x    Care Manager     Consultant  x                      Multidiciplinary team rounds were held today with , nursing, pharmacist and clinical coordinator. Patient's plan of care was discussed; medications were reviewed and discharge planning was addressed. ________________________________________________________________________  Total NON critical care TIME:  25    Minutes    Total CRITICAL CARE TIME Spent:  Minutes non procedure based          Comments   >50% of visit spent in counseling and coordination of care x     This includes time during multidisciplinary rounds if indicated above   ________________________________________________________________________  Lizeth Reyez MD     Procedures: see electronic medical records for all procedures/Xrays and details which were not copied into this note but were reviewed prior to creation of Plan. LABS:  I reviewed today's most current labs and imaging studies.   Pertinent labs include:  Recent Labs     10/10/21  0457   WBC 11.5*   HGB 16.9   HCT 45.4        Recent Labs     10/11/21  0253 10/10/21  1747 10/10/21  0458    139 135*   K 3.2* 3.6 3.4*   * 108 104   CO2 23 22 22   * 281* 244*   BUN 37* 33* 32*   CREA 1.26 1.43* 1.35*   CA 9.4 10.1 9.6   MG  --   --  2.0   ALB  --   --  2.8*   TBILI  --   --  2.0*   ALT  --   --  18

## 2021-10-11 NOTE — HOSPICE
[facility] ANNE RN Note:  Hospice consult received, reviewing chart. Will follow up with Unit Nurse and Care Manager to discuss plan of care, patient status and discharge disposition within. 11:50 T/C to [facility] who stated she was having a COPD/asthma attack and had to leave the hospital to take her medicine and would like me to contact her later to discuss hospice over the phone. Patient likely meets inpatient hospice criteria for respiratory symptom management per chart review. Will assess patient bedside and contact Alexandrea regarding hospice plan of care. Thank you for the opportunity to be of service to this patient.

## 2021-10-11 NOTE — PROGRESS NOTES
End of Shift Note    Bedside shift change report given to maureen AREVALO (oncoming nurse) by Baldev Haas RN (offgoing nurse). Report included the following information SBAR and Kardex    Shift worked:  Day     Shift summary and any significant changes:     0730: patient O2 at 83% on room air. Placed on NC at 6L O2 only went up to 85%. Placed on 15L non rebreather O2 up to 99%. Weaned to 12L venti mask. MD notified. Spoke with MD regarding q6 labs was instructed to DC  1345: patients O2 dropped to 86% with 12L on venti mask. Placed on 15L nonrebreather. Breath sounds very wet with crackles. HR tachy up to 140's Rapid response evaluated patient at bedside and MD was notified. 1343: PRN morphine given  1347: PRN robinul given    1400: comfort only measures placed. MD repaged about orders to DC cardene drip. Hospice instructed to place patient on 6L NC for comfort   1401: PRN morphine given    1500: DC cardene drip per MD      Concerns for physician to address:  NA     Zone phone for oncoming shift:          Activity:  Activity Level: Bed Rest  Number times ambulated in hallways past shift: 0  Number of times OOB to chair past shift: 0    Cardiac:   Cardiac Monitoring: Yes      Cardiac Rhythm: Sinus Tachy    Access:   Current line(s): PIV     Genitourinary:   Urinary status: voiding, incontinent and external catheter    Respiratory:   O2 Device: Nasal cannula  Chronic home O2 use?: NO  Incentive spirometer at bedside: NO     GI:  Last Bowel Movement Date:  (unknown)  Current diet:  ADULT DIET Regular; 4 carb choices (60 gm/meal)  Passing flatus: YES  Tolerating current diet: YES       Pain Management:   Patient states pain is manageable on current regimen: N/A    Skin:  Niraj Score: 9  Interventions: turn team, float heels and foam dressing    Patient Safety:  Fall Score:  Total Score: 2  Interventions: bed/chair alarm, assistive device (walker, cane, etc) and pt to call before getting OOB  High Fall Risk: Yes    Length of Stay:  Expected LOS: 2d 0h  Actual LOS: [address], RN

## 2021-10-11 NOTE — PROGRESS NOTES
0700: End of Shift Note    Bedside shift change report given to Yeyo Anderson RN (oncoming nurse) by Johnson Aguirre (offgoing nurse). Report included the following information SBAR, Kardex, Intake/Output, MAR, Recent Results and Cardiac Rhythm ST-SR    Shift worked:  [id number]     Shift summary and any significant changes:     BP has decreased well, went down for head CT; still unresponsive except for pain     Concerns for physician to address:  head CT results     Zone phone for oncoming shift:          Activity:  Activity Level: Bed Rest  Number times ambulated in hallways past shift: 0  Number of times OOB to chair past shift: 0    Cardiac:   Cardiac Monitoring: Yes      Cardiac Rhythm: Sinus Tachy, Sinus Rhythm    Access:   Current line(s): PIV     Genitourinary:   Urinary status: incontinent    Respiratory:   O2 Device: None (Room air)  Chronic home O2 use?: NO  Incentive spirometer at bedside: NO     GI:  Last Bowel Movement Date:  (unknown)  Current diet:  ADULT DIET Regular; 4 carb choices (60 gm/meal)  Passing flatus: NO  Tolerating current diet: NO       Pain Management:   Patient states pain is manageable on current regimen: YES    Skin:  Niraj Score: 9  Interventions: internal/external urinary devices    Patient Safety:  Fall Score:  Total Score: 2  Interventions: bed/chair alarm  High Fall Risk: Yes    Length of Stay:  Expected LOS: 2d 0h  Actual LOS: [facility]

## 2021-10-11 NOTE — PROGRESS NOTES
Neurology Progress Note    Patient ID:  Dion Kovacs  [id number]  44 y.o.  1955      CHIEF COMPLAINT: Altered mental status and left-sided weakness    Subjective:      Patient has complaints altered mental status and left-sided weakness. Patient had a CT scan this morning shows slight progression of disease, and probably some progression of his tentorial herniation, with more of a subtentorial herniation and brainstem compression and midline herniation now at least 9 mm as compared to the previous 6, and the patient is what is worse, and he also has subarachnoid hemorrhage and some other hemorrhages from all of this pressure and now he is developing hydrocephalus in the left ventricles because of probably third ventricular compression, and the patient's family has decided they want no surgery because in general surgery will prolong the chance of life, but still leave him severely disabled. We will try mannitol for edema, and control his blood pressure too high normal range to allow cerebral perfusion. Patient lethargic, does  with his right hand, but does not really speak anymore and other than squeeze on command he does not do much else. We will continue medical management the best we can, have discussed with neurosurgery and the hospitalist.  Also nursing staff. Patient has multiple embolic strokes in the brain of questionable etiology. Patient was found to have a stroke with multiple areas of infarction scattered throughout the right hemisphere and both occipital lobes, which does raise the question of possible embolic phenomenon on his MRI scan, and the patient on his CTA has severe stenosis or near occlusion of the basilar artery, with the left vertebral artery being dominant in the right vertebral artery terminating in the PICA with retrograde filling of the basilar artery by the posterior communicating artery on the right.   Patient had moderate to severe stenosis in the proximal M2 segment with a small 3.5 x 2 mm aneurysm at this region also. His right frontal infarction was thought to be subacute. He had in addition diffuse cerebral microvascular disease and stenosis of the supraclinoid internal carotid artery on the right. Patient is lethargic and his swallowing is poor and he probably has bilateral aspiration pneumonia. Nurses report some low-grade fever and difficulty swallowing so he is being kept n.p.o. this morning. Patient denies any pain at this time. He is in regular rhythm and has no signs of A. fib so far.     Current Facility-Administered Medications   Medication Dose Route Frequency    LORazepam (ATIVAN) injection 1 mg  1 mg IntraVENous Q15MIN PRN    glycopyrrolate (ROBINUL) injection 0.2 mg  0.2 mg IntraVENous Q4H PRN    morphine injection 2 mg  2 mg IntraVENous Q15MIN PRN    [Held by provider] lisinopriL (PRINIVIL, ZESTRIL) tablet 10 mg  10 mg Oral DAILY    albuterol-ipratropium (DUO-NEB) 2.5 MG-0.5 MG/3 ML  3 mL Nebulization Q4H PRN    ondansetron (ZOFRAN) injection 4 mg  4 mg IntraVENous Q4H PRN    [Held by provider] aspirin delayed-release tablet 81 mg  81 mg Oral DAILY    [Held by provider] atorvastatin (LIPITOR) tablet 40 mg  40 mg Oral DAILY    [Held by provider] clopidogreL (PLAVIX) tablet 75 mg  75 mg Oral DAILY    acetaminophen (TYLENOL) tablet 650 mg  650 mg Oral Q4H PRN    Or    acetaminophen (TYLENOL) solution 650 mg  650 mg Per NG tube Q4H PRN    Or    acetaminophen (TYLENOL) suppository 650 mg  650 mg Rectal Q4H PRN    [Held by provider] enoxaparin (LOVENOX) injection 40 mg  40 mg SubCUTAneous Q24H    glucose chewable tablet 16 g  4 Tablet Oral PRN    dextrose (D50W) injection syrg 12.5-25 g  12.5-25 g IntraVENous PRN    glucagon (GLUCAGEN) injection 1 mg  1 mg IntraMUSCular PRN        Past Medical History:   Diagnosis Date    CAD (coronary artery disease)     Cancer (HCC)     prostate    Diabetes (HCC)     GERD (gastroesophageal reflux disease)     Heart failure (HCC)     cardiac stent    Hyperlipemia     Hypertension     Screen for colon cancer 6/11/2015    [facility] [address]     Stroke ([facility] [address]     2000       Past Surgical History:   Procedure Laterality Date    MS CARDIAC SURG PROCEDURE UNLIST      stent       [unfilled]    Social History     Tobacco Use    Smoking status: Never Smoker    Smokeless tobacco: Never Used   Substance Use Topics    Alcohol use: No       Current Facility-Administered Medications   Medication Dose Route Frequency Provider Last Rate Last Admin    LORazepam (ATIVAN) injection 1 mg  1 mg IntraVENous Q15MIN PRN Antolin Iyer MD   1 mg at 10/11/21 1455    glycopyrrolate (ROBINUL) injection 0.2 mg  0.2 mg IntraVENous Q4H PRN Antolin Iyer MD   0.2 mg at 10/11/21 1347    morphine injection 2 mg  2 mg IntraVENous Q15MIN PRN Antolin Iyer MD   2 mg at 10/11/21 1455    [Held by provider] lisinopriL (PRINIVIL, ZESTRIL) tablet 10 mg  10 mg Oral DAILY Antolin Iyer MD        albuterol-ipratropium (DUO-NEB) 2.5 MG-0.5 MG/3 ML  3 mL Nebulization Q4H PRN Antolin Iyer MD        [facility] injection 4 mg  4 mg IntraVENous Q4H PRN Krystin Griffiths MD        [Held by provider] aspirin delayed-release tablet 81 mg  81 mg Oral DAILY Krystin Griffiths MD   81 mg at 10/08/21 0946    [Held by provider] atorvastatin (LIPITOR) tablet 40 mg  40 mg Oral DAILY Krystin Griffiths MD   40 mg at 10/08/21 0947    [Held by provider] clopidogreL (PLAVIX) tablet 75 mg  75 mg Oral DAILY Krystin Griffiths MD   75 mg at 10/08/21 0947    acetaminophen (TYLENOL) tablet 650 mg  650 mg Oral Q4H PRN Krystin Griffiths MD        Or   [employer] acetaminophen (TYLENOL) solution 650 mg  650 mg Per NG tube Q4H PRN Krystin Griffiths MD        Or    acetaminophen (TYLENOL) suppository 650 mg  650 mg Rectal Q4H PRN Krystin Griffiths MD   650 mg at 10/09/21 0857    [Held by provider] enoxaparin (LOVENOX) injection 40 mg  40 mg SubCUTAneous Q24H Krystin Griffiths MD   40 mg at 10/09/21 1131  glucose chewable tablet 16 g  4 Tablet Oral PRN David Figueredo MD        dextrose (D50W) injection syrg 12.5-25 g  12.5-25 g IntraVENous PRN David Figueredo MD        glucagon (GLUCAGEN) injection 1 mg  1 mg IntraMUSCular PRN David Figueredo MD           No Known Allergies    Review of Systems:    Review of systems not obtained due to patient factors.     Objective:    Objective:     Patient Vitals for the past 24 hrs:   BP Temp Pulse Resp SpO2   10/11/21 1400 (!) 111/93  (!) 140 30 92 %   10/11/21 1345 116/80  (!) 131 (!) 39 (!) 86 %   10/11/21 1330 125/77  95 29 96 %   10/11/21 1329   97 28 97 %   10/11/21 1328   95 28 96 %   10/11/21 1327   96 29 97 %   10/11/21 1326   95 29 96 %   10/11/21 1325   (!) 101 (!) 32 96 %   10/11/21 1324   97 24 96 %   10/11/21 1323   95 27 96 %   10/11/21 1322   98 30 96 %   10/11/21 1300 128/75  (!) 103 29 100 %   10/11/21 1151 127/78  92     10/11/21 1125 130/65 98.5 °F (36.9 °C) 86 24 90 %   10/11/21 1016 127/77  97     10/11/21 0220 127/80 97.4 °F (36.3 °C) 87 25 91 %   10/11/21 0115 (!) 140/83  (!) 103     10/10/21 2307 135/86 97.3 °F (36.3 °C) 99 30 91 %   10/10/21 2027 (!) 142/93 97.2 °F (36.2 °C) 96 (!) 32 93 %   10/10/21 2009 (!) 154/90  (!) 103     10/10/21 1831 137/79  (!) 106     10/10/21 1704 (!) 153/95  (!) 110     10/10/21 1602 (!) 151/91  (!) 109     10/10/21 1600 (!) 151/91  100 (!) 34 91 %   10/10/21 1545 (!) 159/92  (!) 107 (!) 37 92 %   10/10/21 1530 (!) 150/96  90 (!) 37 91 %         Lab Review   Recent Results (from the past 24 hour(s))   GLUCOSE, POC    Collection Time: 10/10/21  5:32 PM   Result Value Ref Range    Glucose (POC) 243 (H) 65 - 117 mg/dL    Performed by Mary Kay Bryant RN    METABOLIC PANEL, BASIC    Collection Time: 10/10/21  5:47 PM   Result Value Ref Range    Sodium 139 136 - 145 mmol/L    Potassium 3.6 3.5 - 5.1 mmol/L    Chloride 108 97 - 108 mmol/L    CO2 22 21 - 32 mmol/L    Anion gap 9 5 - 15 mmol/L    Glucose 281 (H) 65 - 100 mg/dL    BUN 33 (H) 6 - 20 MG/DL    Creatinine 1.43 (H) 0.70 - 1.30 MG/DL    BUN/Creatinine ratio 23 (H) 12 - 20      GFR est AA 60 (L) >60 ml/min/1.73m2    GFR est non-AA 49 (L) >60 ml/min/1.73m2    Calcium 10.1 8.5 - 10.1 MG/DL   OSMOLALITY, SERUM/PLASMA    Collection Time: 10/10/21  5:47 PM   Result Value Ref Range    Osmolality, serum/plasma 322 mOsm/kg H2O   GLUCOSE, POC    Collection Time: 10/10/21  9:28 PM   Result Value Ref Range    Glucose (POC) 238 (H) 65 - 117 mg/dL    Performed by Matt Moser PCT    OSMOLALITY, SERUM/PLASMA    Collection Time: 10/11/21  2:53 AM   Result Value Ref Range    Osmolality, serum/plasma 324 mOsm/kg I8D   METABOLIC PANEL, BASIC    Collection Time: 10/11/21  2:53 AM   Result Value Ref Range    Sodium 144 136 - 145 mmol/L    Potassium 3.2 (L) 3.5 - 5.1 mmol/L    Chloride 114 (H) 97 - 108 mmol/L    CO2 23 21 - 32 mmol/L    Anion gap 7 5 - 15 mmol/L    Glucose 198 (H) 65 - 100 mg/dL    BUN 37 (H) 6 - 20 MG/DL    Creatinine 1.26 0.70 - 1.30 MG/DL    BUN/Creatinine ratio 29 (H) 12 - 20      GFR est AA >60 >60 ml/min/1.73m2    GFR est non-AA 57 (L) >60 ml/min/1.73m2    Calcium 9.4 8.5 - 10.1 MG/DL   GLUCOSE, POC    Collection Time: 10/11/21  6:59 AM   Result Value Ref Range    Glucose (POC) 161 (H) 65 - 117 mg/dL    Performed by [facility] PCT            Additional comments:I personally viewed and interpreted the patient's CTAs and MRIs are reviewed personally on the PACS system. MRI Results (most recent):  Results from [place] encounter on 10/07/21    MRI BRAIN WO CONT    Narrative  EXAM: MRI BRAIN WO CONT    INDICATION: stroke workup    COMPARISON: CTA head neck 10/7/2021. CONTRAST: None. TECHNIQUE:  Multiplanar multisequence acquisition without contrast of the brain.     FINDINGS:  Multiple acute to subacute infarcts scattered throughout the right frontal and  parietal lobes involving the cortex and white matter, largest involving the  right middle frontal gyrus. Additional punctate acute to subacute infarcts noted  in the bilateral occipital lobes. Generalized parenchymal volume loss with commensurate dilation of the sulci and  ventricular system. Scattered periventricular and deep white matter T2/FLAIR  hyperintensities, consistent with mild chronic microvascular ischemic disease. Small chronic infarcts in the left occipital lobe and right justus. There is no  acute hemorrhage, extra-axial fluid collection, or mass effect. There is no  cerebellar tonsillar herniation. Expected arterial flow-voids are present. Scattered mucosal thickening in the left frontal sinus, and bilateral ethmoidal  air cells without air-fluid level. The mastoid air cells and middle ears are  clear. The orbital contents are within normal limits. No significant osseous or  scalp lesions are identified. Impression  1. Multiple acute to subacute infarcts scattered throughout the right frontal  and parietal lobes (MCA territory), largest in the right middle frontal gyrus. Additional punctate acute to subacute infarcts in the bilateral occipital lobes. 2. Generalized parenchymal volume loss and mild chronic microvascular ischemic  disease. Small chronic infarcts in the left occipital lobe and right justus. Results from [place] encounter on 10/07/21    MRI BRAIN WO CONT    Narrative  EXAM: MRI BRAIN WO CONT    INDICATION: stroke workup    COMPARISON: CTA head neck 10/7/2021. CONTRAST: None. TECHNIQUE:  Multiplanar multisequence acquisition without contrast of the brain. FINDINGS:  Multiple acute to subacute infarcts scattered throughout the right frontal and  parietal lobes involving the cortex and white matter, largest involving the  right middle frontal gyrus. Additional punctate acute to subacute infarcts noted  in the bilateral occipital lobes.     Generalized parenchymal volume loss with commensurate dilation of the sulci and  ventricular system. Scattered periventricular and deep white matter T2/FLAIR  hyperintensities, consistent with mild chronic microvascular ischemic disease. Small chronic infarcts in the left occipital lobe and right justus. There is no  acute hemorrhage, extra-axial fluid collection, or mass effect. There is no  cerebellar tonsillar herniation. Expected arterial flow-voids are present. Scattered mucosal thickening in the left frontal sinus, and bilateral ethmoidal  air cells without air-fluid level. The mastoid air cells and middle ears are  clear. The orbital contents are within normal limits. No significant osseous or  scalp lesions are identified. Impression  1. Multiple acute to subacute infarcts scattered throughout the right frontal  and parietal lobes (MCA territory), largest in the right middle frontal gyrus. Additional punctate acute to subacute infarcts in the bilateral occipital lobes. 2. Generalized parenchymal volume loss and mild chronic microvascular ischemic  disease. Small chronic infarcts in the left occipital lobe and right justus. NEUROLOGICAL EXAM:    Appearance: The patient is poorly developed and nourished, provides a poor history and is in no acute distress. Mental Status:  Patient is nonverbal and very lethargic and obtunded, appropriate but very lethargic and encephalopathic. Cranial Nerves:    Unreliable visual fields. Fundi are benign but poorly seen SUE, EOM's full, no nystagmus, no ptosis. Facial sensation is normal. Corneal reflexes are not tested. Facial movement is weak on the left. Hearing is abnormal bilaterally. Palate is midline with normal sternocleidomastoid and trapezius muscles are normal. Tongue is midline.   Neck without meningismus or bruits   Motor:   Patient flaccid on the left, and withdraws slightly to pain on the right   Reflexes:   Deep tendon reflexes 1+/4 and symmetrical.  No babinski or clonus present   Sensory:    Patient decerebrates to pain on the left and has nonpurposeful withdrawal weakly on the right   Gait:  Not testable. Tremor:   No tremor noted. Cerebellar:  Not testable abnormal cerebellar signs present on Romberg, tandem, and finger-nose-finger exam.   Neurovascular:  Normal heart sounds and regular rhythm, peripheral pulses decreased, and no carotid bruits. Assessment:         ICD-10-CM ICD-9-CM    1. Ischemic stroke of frontal lobe ([facility])  I63.9 434.91    2. Chronic ischemic right MCA stroke  I69.30 V12.54    3. Somnolence  R40.0 780.09    4. Bilateral carotid artery stenosis  I65.23 433.10      433.30    5. Basilar artery stenosis  I65.1 433.00      Active Problems:    Stroke (cerebrum) ([facility]) (74/6/9719)      Embolic stroke involving right middle cerebral artery ([facility] [address] (10/9/2021)      Bilateral carotid artery stenosis (10/9/2021)      Basilar artery stenosis, symptomatic, without infarction (10/9/2021)      Vasogenic cerebral edema ([facility] [address] (10/10/2021)        Plan:     Patient with progressive edema from his stroke, now with increased midline herniation and brainstem compression and further herniation and now compression of the third ventricle and progressive hydrocephalus of the left lateral ventricles, associated with some subarachnoid and petechial hemorrhages in the areas of infarction and prognosis year is almost certainly hopeless and patient has a terminal disease. Family has decided on no surgery. Patient with progression of his cerebral edema, have talked to neurosurgeon the hospitalist, and the family desires to hold on surgery because it increases the chance of survival but not his recovery, and he will remain severely disabled we will try medical management controlling the blood pressure in the high range to allow cerebral perfusion and starting mannitol to try to prevent cerebral edema. Patient with difficult problem of large stroke, and the source is unclear.   He has severe basilar artery disease, and has severe right carotid disease at the siphon and the M2 middle cerebral artery branch but could be sources, and because of the bilateral occipital strokes cardiac emboli are still a likely possibility. It could be they are from his basilar artery disease. Discussed with the hospitalist, and neurosurgery. Very difficult case, prognosis here is very poor  Family has decided on DNR status in addition. We will repeat CT in the morning.     Signed:  Tiara Alfaro MD  10/11/2021  11:00 AM    Mark Martínez MD  [phone]

## 2021-10-11 NOTE — PROGRESS NOTES
Problem: Falls - Risk of  Goal: *Absence of Falls  Description: Document Carine Mckeon Fall Risk and appropriate interventions in the flowsheet.   Outcome: Progressing Towards Goal  Note: Fall Risk Interventions:  Mobility Interventions: Bed/chair exit alarm    Mentation Interventions: Adequate sleep, hydration, pain control, Bed/chair exit alarm, Door open when patient unattended    Medication Interventions: Bed/chair exit alarm, Patient to call before getting OOB, Teach patient to arise slowly    Elimination Interventions: Bed/chair exit alarm, Call light in reach, Toilet paper/wipes in reach    History of Falls Interventions: Bed/chair exit alarm, Door open when patient unattended

## 2021-10-11 NOTE — PROGRESS NOTES
Hospitalist Progress Note    NAME: Ava Baxter   :  1955   MRN:  [id number]     Interim Hospital Summary: 77 y.o. 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 The left  vertebral artery is dominant with a right vertebral artery terminates in PICA  there is retrograde filling of the basilar artery via posterior communicating  artery on the right. Central pontine hypodensity is concerning for acute infarction. Moderate to severe stenosis proximal M2 anterior division segment with small 3.5  x 2 mm aneurysm associated with this region. Right frontal infarction is subacute. Moderate chronic microvascular ischemic change. Mild cervical atherosclerotic vasculopathy. Mild to moderate stenosis of the supraclinoid ICA on the right. MRI Brain:IMPRESSION  1. Multiple acute to subacute infarcts scattered throughout the right frontal  and parietal lobes (MCA territory), largest in the right middle frontal gyrus. Additional punctate acute to subacute infarcts in the bilateral occipital lobes. 2. Generalized parenchymal volume loss and mild chronic microvascular ischemic  disease. Small chronic infarcts in the left occipital lobe and right justus. CT Head 10/10: IMPRESSION  1. Known right MCA infarct with increased edema, right left midline shift, mass effect, and increase effacement of the basilar cisterns.   2.  Scattered foci of hemorrhage throughout the infarct possibly petechial although hemorrhagic transformation is difficult to exclude.     CT Head 10/11: IMPRESSION  Increased right left midline shift. Increased hydrocephalus and transependymal edema on the left. Sulcal congestion/petechial/subarachnoid hemorrhage not significantly changed    -EEG Normal  -Echo EF 55-60%, no AS, trace MR, no shunt seen  -LDL 96; -HgA1c 5.9  -ASA supp. Holding plavix and statin as not safe for PO intake  -continue mannitol infusion q6 hours  -BP better with cardene drip but CT head today is worse. Patient is not responsive. -10/10 discussed with daughter Joselyn Fernandez) today and other family members (10/09 note). They understand that his near term prognosis is grim and that these medial measures may not halt the progression of his brain herniation and patient may ultimately succumb to the aggregate burden of all his acute neurologic problems. They understand that even if he is to survive this insult, he will be left with significant disability from his neurologic deficits and that his overall quality of life will be very poor. They do not want to pursue surgery at this time and want to focus on comfort if he continues to decline. -10/11. Needing 100% NRB. Was 93% on RA this AM.   Left message on VM. Will consult Palliative care. Acute hypoxic respiratory failure  Sepsis due to aspiration PNA  -10/09 fever 101, RR 34, congested, AMS with CXR New bilateral airspace disease, right greater than left  -blood cultures NG  -continue zosyn  -NPO. Hold po meds  -oxygen prn    CAD  HTN  -not safe for PO. Need drip for BP control    HLD  BPH  Hx of CVA in 2000  25.0 - 29.9 Overweight / Body mass index is 28.28 kg/m². Estimated discharge date: October 10  Barriers:    Code status: Full  Prophylaxis: Lovenox  Recommended Disposition: Home w/Family       Subjective:     Chief Complaint / Reason for Physician Visit  Follow up of CVA, CAD, HTN  Chart reviewed in detail. Discussed with RN events overnight.    Remains unresponsive and now hypoxic     Review of Systems:  Symptom Y/N Comments  Symptom Y/N Comments   Fever/Chills y   Chest Pain     Poor Appetite    Edema     Cough    Abdominal Pain     Sputum    Joint Pain     SOB/JANE    Pruritis/Rash     Nausea/vomit    Tolerating PT/OT     Diarrhea    Tolerating Diet     Constipation    Other       Could NOT obtain due to:      PO intake: No data found. Objective:     VITALS:   Last 24hrs VS reviewed since prior progress note. Most recent are:  Patient Vitals for the past 24 hrs:   Temp Pulse Resp BP SpO2   10/11/21 0220 97.4 °F (36.3 °C) 87 25 127/80 91 %   10/11/21 0115  (!) 103  (!) 140/83    10/10/21 2307 97.3 °F (36.3 °C) 99 30 135/86 91 %   10/10/21 2027 97.2 °F (36.2 °C) 96 (!) 32 (!) 142/93 93 %   10/10/21 2009  (!) 103  (!) 154/90    10/10/21 1831  (!) 106  137/79    10/10/21 1704  (!) 110  (!) 153/95    10/10/21 1602  (!) 109  (!) 151/91    10/10/21 1600  100 (!) 34 (!) 151/91 91 %   10/10/21 1545  (!) 107 (!) 37 (!) 159/92 92 %   10/10/21 1530  90 (!) 37 (!) 150/96 91 %   10/10/21 1515 98.5 °F (36.9 °C) (!) 105 (!) 38 (!) 162/94 92 %   10/10/21 1500  (!) 107 (!) 39 (!) 168/103 92 %   10/10/21 1446  (!) 101 (!) 36 (!) 167/98 92 %   10/10/21 1445  97 (!) 36 (!) 167/98 91 %   10/10/21 1334  (!) 111  (!) 212/129    10/10/21 1320  100  (!) 207/138    10/10/21 1231 98 °F (36.7 °C) 99 (!) 46 (!) 207/138 92 %   10/10/21 1139  96 (!) 50 (!) 203/106 96 %       Intake/Output Summary (Last 24 hours) at 10/11/2021 0838  Last data filed at 10/11/2021 0545  Gross per 24 hour   Intake    Output 300 ml   Net -300 ml        I had a face to face encounter, and independently examined this patient on 10/11/2021, as outlined below:  PHYSICAL EXAM:  General: WD, WN. Not alert. Unresponsive  EENT:  No spontaneous eye opening;  Pupils not reactive but looks equal  Resp:  CTA bilaterally, no wheezing or rales. No accessory muscle use  CV:  Regular  rhythm,  No edema  GI:  Soft, Non distended, Non tender.   +Bowel sounds  Neurologic:  Left flaccid  Psych:   Not anxious nor agitated  Skin:  No rashes. No jaundice    Reviewed most current lab test results and cultures  YES  Reviewed most current radiology test results   YES  Review and summation of old records today    NO  Reviewed patient's current orders and MAR    YES  PMH/SH reviewed - no change compared to H&P  ________________________________________________________________________  Care Plan discussed with:    Comments   Patient     Family  x    RN x    Care Manager     Consultant  x                      Multidiciplinary team rounds were held today with , nursing, pharmacist and clinical coordinator. Patient's plan of care was discussed; medications were reviewed and discharge planning was addressed. ________________________________________________________________________  Total NON critical care TIME:  25    Minutes    Total CRITICAL CARE TIME Spent:  Minutes non procedure based          Comments   >50% of visit spent in counseling and coordination of care x     This includes time during multidisciplinary rounds if indicated above   ________________________________________________________________________  Milo Scott MD     Procedures: see electronic medical records for all procedures/Xrays and details which were not copied into this note but were reviewed prior to creation of Plan. LABS:  I reviewed today's most current labs and imaging studies.   Pertinent labs include:  Recent Labs     10/10/21  0457   WBC 11.5*   HGB 16.9   HCT 45.4        Recent Labs     10/11/21  0253 10/10/21  1747 10/10/21  0458    139 135*   K 3.2* 3.6 3.4*   * 108 104   CO2 23 22 22   * 281* 244*   BUN 37* 33* 32*   CREA 1.26 1.43* 1.35*   CA 9.4 10.1 9.6   MG  --   --  2.0   ALB  --   --  2.8*   TBILI  --   --  2.0*   ALT  --   --  18

## 2021-10-11 NOTE — PROGRESS NOTES
Occupational Therapy Note:    Chart reviewed. Note family has elected for pt to go hospice at this time. Will complete OT orders. Thank you.     Romero Alegria, OTR/L

## 2021-10-11 NOTE — PROGRESS NOTES
Transition of Care Plan:     RUR:14%  Disposition: Home   Follow up appointments: None  DME needed: Own Cane  Transportation at Discharge: [address] or means to access home:    Sister has keys    IM Medicare Letter: Given 10/11/2021  Is patient a BCPI-A Bundle: If yes, was Bundle Letter given?:     Caregiver Contact: Sister Kian Pop [phone]  Discharge Caregiver contacted prior to discharge? CM to contact     11:30am-CM met with pt,  pt's sister Franco Hale, pt's mother and other family members at bedside. CM discussed with pt's sister about hospice. Pt's sister was interested in hospice. CM reviewed with pt's sister hospice list. Pt's sister selected [facility] hospice. FOC signed by pt's sister and placed in pt's sister chart. CM  discuss with her about pt's Medicare rights and their right to appeal the discharge. Pt's sister understood pt's Medicare rights. Pt unable to sign. Pt's sister signed 2nd IM Letter. Copy of 2nd IM letter was placed in pt bedside chart. CM will continue to follow patient for discharge planning needs and arrange for services as deemed necessary.     Sabine Wright [address] [place] [address]  [phone]

## 2021-10-11 NOTE — HOSPICE
Hospice Liaison Note:     Spoke with Ami Ferguson again this afternoon, she states that she does not have e-mail and does not want to come back to the hospital today to sign hospice consents. There seemed to be confusion as she believes she signed paperwork with someone this morning when she was here but I explained to her that was not the hospice team.     D/w bedside RN about symptom management and keeping patient comfortable. It is not likely he will survive long and is appropriate to wean from NRB to NC using opioids to manage respiratory distress and stop cardiac drips as comfort care measures have been initiated. If patient sustains overnight and Alexandrea is willing to come to the hospital to sign hospice consents we can formally admit to inpatient hospice LOC tomorrow. Otherwise, comfort measures and symptom medications are in place and hospice is available to offer support to staff. Please contact hospice with any questions at [phone].     David Canales RN  Clinical Nurse Marsha Browne

## 2021-10-12 NOTE — PROGRESS NOTES
Problem: Falls - Risk of  Goal: *Absence of Falls  Description: Document Nikki Gaines Fall Risk and appropriate interventions in the flowsheet.   Outcome: Progressing Towards Goal  Note: Fall Risk Interventions:  Mobility Interventions: Bed/chair exit alarm    Mentation Interventions: Bed/chair exit alarm    Medication Interventions: Bed/chair exit alarm    Elimination Interventions: Bed/chair exit alarm, Call light in reach    History of Falls Interventions: Bed/chair exit alarm, Door open when patient unattended         Problem: Patient Education: Go to Patient Education Activity  Goal: Patient/Family Education  Outcome: Progressing Towards Goal

## 2021-10-12 NOTE — PROGRESS NOTES
Problem: Falls - Risk of  Goal: *Absence of Falls  Description: Document Filomena Baird Fall Risk and appropriate interventions in the flowsheet. Outcome: Progressing Towards Goal  Note: Fall Risk Interventions:  Mobility Interventions: Bed/chair exit alarm    Mentation Interventions: Bed/chair exit alarm, Door open when patient unattended, Room close to nurse's station    Medication Interventions: Bed/chair exit alarm    Elimination Interventions: Bed/chair exit alarm, Call light in reach    History of Falls Interventions: Bed/chair exit alarm, Room close to nurse's station      Problem: Pressure Injury - Risk of  Goal: *Prevention of pressure injury  Description: Document Niraj Scale and appropriate interventions in the flowsheet. Outcome: Progressing Towards Goal  Note: Pressure Injury Interventions:  Sensory Interventions: Assess need for specialty bed, Keep linens dry and wrinkle-free, Turn and reposition approx. every two hours (pillows and wedges if needed)    Moisture Interventions: Absorbent underpads, Check for incontinence Q2 hours and as needed, Internal/External urinary devices    Activity Interventions: Assess need for specialty bed    Mobility Interventions: Assess need for specialty bed, HOB 30 degrees or less, Turn and reposition approx.  every two hours(pillow and wedges)    Nutrition Interventions: Document food/fluid/supplement intake    Friction and Shear Interventions: HOB 30 degrees or less, Lift sheet

## 2021-10-12 NOTE — PROGRESS NOTES
End of Shift Note    Bedside shift change report given to Varun Dangelo (oncoming nurse) by Adelaida Zendejas RN (offgoing nurse). Report included the following information SBAR, Kardex and ED Summary    Shift worked:  7a-7p     Shift summary and any significant changes:     Pt arrived from CCU in afternooon on comfort measures. He had sustained tachypnea, several PRN doses of morphine given. Concerns for physician to address:  none     Zone phone for oncoming shift:   [phone]       Activity:  Activity Level: Bed Rest  Number times ambulated in hallways past shift: 0  Number of times OOB to chair past shift: 0    Cardiac:   Cardiac Monitoring: No      Cardiac Rhythm: Sinus Tachy    Access:   Current line(s): PIV     Genitourinary:   Urinary status: external catheter    Respiratory:   O2 Device: Nasal cannula  Chronic home O2 use?: NO  Incentive spirometer at bedside: NO     GI:  Last Bowel Movement Date:  (unknown)  Current diet:  No diet orders on file  Passing flatus: YES  Tolerating current diet: NO       Pain Management:   Patient states pain is manageable on current regimen: NO    Skin:  Niraj Score: 9  Interventions: turn team    Patient Safety:  Fall Score:  Total Score: 2  Interventions: bed/chair alarm  High Fall Risk: Yes    Length of Stay:  Expected LOS: 2d 0h  Actual LOS: Chandana Osorio, RN

## 2021-10-12 NOTE — PROGRESS NOTES
10/11/21 1952   Vitals   Temp 98.8 °F (37.1 °C)   Temp Source Axillary   Pulse (Heart Rate) (!) 124   Heart Rate Source Monitor   Resp Rate (!) 31   O2 Sat (%) 90 %   Level of Consciousness (!) Unresponsive (3)   /82   MAP (Monitor) 92   MAP (Calculated) 93   BP 1 Location Right upper arm   BP 1 Method Automatic   BP Patient Position At rest   Cardiac Rhythm Sinus Tachy   MEWS Score 5   Box Number hw   Electrodes Replaced No   Pain 1   Pain Scale 1 Adult Nonverbal Pain Scale   Adult Nonverbal Pain Scale   Face 0   Activity (Movement) 0   Guarding 0   Physiology (Vital Signs) 1   Respiratory 1   Total Score 2   Oxygen Therapy   Pulse via Oximetry 120 beats per minute   O2 Device Nasal cannula   O2 Flow Rate (L/min) 6 l/min   HR and RR elevated, pt is on comfort measures, charge RN aware, will continue to monitor

## 2021-10-12 NOTE — PROGRESS NOTES
Neurology Progress Note    Patient ID:  Law Hall  [id number]  41 y.o.  1955      CHIEF COMPLAINT: Altered mental status and left-sided weakness    Subjective:      Patient has complaints altered mental status and left-sided weakness. Patient had a CT scan yesterday morning shows slight progression of disease, and probably some progression of his tentorial herniation, with more of a subtentorial herniation and brainstem compression and midline herniation now at least 9 mm as compared to the previous 6, and the patient is what is worse, and he also has subarachnoid hemorrhage and some other hemorrhages from all of this pressure and now he is developing hydrocephalus in the left ventricles because of probably third ventricular compression, and the patient's family has decided they want no surgery because in general surgery will prolong the chance of life, but still leave him severely disabled.   The patient has been made palliative and comfort care and will be transferred to that service, and we will follow as needed, support is being withdrawn and comfort measures are in place    Current Facility-Administered Medications   Medication Dose Route Frequency    LORazepam (ATIVAN) injection 1 mg  1 mg IntraVENous Q15MIN PRN    glycopyrrolate (ROBINUL) injection 0.2 mg  0.2 mg IntraVENous Q4H PRN    morphine injection 2 mg  2 mg IntraVENous Q15MIN PRN    [Held by provider] lisinopriL (PRINIVIL, ZESTRIL) tablet 10 mg  10 mg Oral DAILY    albuterol-ipratropium (DUO-NEB) 2.5 MG-0.5 MG/3 ML  3 mL Nebulization Q4H PRN    ondansetron (ZOFRAN) injection 4 mg  4 mg IntraVENous Q4H PRN    [Held by provider] aspirin delayed-release tablet 81 mg  81 mg Oral DAILY    [Held by provider] atorvastatin (LIPITOR) tablet 40 mg  40 mg Oral DAILY    [Held by provider] clopidogreL (PLAVIX) tablet 75 mg  75 mg Oral DAILY    acetaminophen (TYLENOL) tablet 650 mg  650 mg Oral Q4H PRN    Or    acetaminophen (TYLENOL) solution 650 mg  650 mg Per NG tube Q4H PRN    Or    acetaminophen (TYLENOL) suppository 650 mg  650 mg Rectal Q4H PRN    [Held by provider] enoxaparin (LOVENOX) injection 40 mg  40 mg SubCUTAneous Q24H    glucose chewable tablet 16 g  4 Tablet Oral PRN    dextrose (D50W) injection syrg 12.5-25 g  12.5-25 g IntraVENous PRN    glucagon (GLUCAGEN) injection 1 mg  1 mg IntraMUSCular PRN        Past Medical History:   Diagnosis Date    CAD (coronary artery disease)     Cancer ([facility]     prostate    [facility]     GERD (gastroesophageal reflux disease)     Heart failure (HCC)     cardiac stent    Hyperlipemia     Hypertension     Screen for colon cancer 6/11/2015    Stroke ([facility]     Stroke ([facility]     2000       Past Surgical History:   Procedure Laterality Date    AL CARDIAC SURG PROCEDURE UNLIST      stent       @[facility]@    Social History     Tobacco Use    Smoking status: Never Smoker    Smokeless tobacco: Never Used   Substance Use Topics    Alcohol use: No       Current Facility-Administered Medications   Medication Dose Route Frequency Provider Last Rate Last Admin    LORazepam (ATIVAN) injection 1 mg  1 mg IntraVENous Q15MIN PRN Tray Andrade MD   1 mg at 10/12/21 1705    glycopyrrolate (ROBINUL) injection 0.2 mg  0.2 mg IntraVENous Q4H PRN Tray Andrade MD   0.2 mg at 10/12/21 1315    morphine injection 2 mg  2 mg IntraVENous Q15MIN PRN Tray Andrade MD   2 mg at 10/12/21 1813    [Held by provider] lisinopriL (PRINIVIL, ZESTRIL) tablet 10 mg  10 mg Oral DAILY Tray Andrade MD        albuterol-ipratropium (DUO-NEB) 2.5 MG-0.5 MG/3 ML  3 mL Nebulization Q4H PRN Tray Andrade MD        [facility]ron [facility]) injection 4 mg  4 mg IntraVENous Q4H PRN Gato Parson MD        [Held by provider] aspirin delayed-release tablet 81 mg  81 mg Oral DAILY Gato Parson MD   81 mg at 10/08/21 0946    [Held by provider] atorvastatin (LIPITOR) tablet 40 mg  40 mg Oral DAILY Gato Parson MD   40 mg at 10/08/21 0947    [Held by provider] clopidogreL (PLAVIX) tablet 75 mg  75 mg Oral DAILY Wendy Shin MD   75 mg at 10/08/21 0947    acetaminophen (TYLENOL) tablet 650 mg  650 mg Oral Q4H PRN Wendy Shin MD        Or   Creston Sicard acetaminophen (TYLENOL) solution 650 mg  650 mg Per NG tube Q4H PRN Wendy Shin MD        Or   Creston Sicard acetaminophen (TYLENOL) suppository 650 mg  650 mg Rectal Q4H PRN Wendy Shin MD   650 mg at 10/09/21 0857    [Held by provider] enoxaparin (LOVENOX) injection 40 mg  40 mg SubCUTAneous Q24H Wendy Shin MD   40 mg at 10/09/21 1133    glucose chewable tablet 16 g  4 Tablet Oral PRN Wendy Shin MD        dextrose (D50W) injection syrg 12.5-25 g  12.5-25 g IntraVENous PRN Wendy Shin MD        glucagon (GLUCAGEN) injection 1 mg  1 mg IntraMUSCular PRN Wendy Shin MD           No Known Allergies    Review of Systems:    Review of systems not obtained due to patient factors. Objective:    Objective:     Patient Vitals for the past 24 hrs:   BP Temp Pulse Resp SpO2   10/12/21 1529 (!) 143/93 98 °F (36.7 °C) (!) 119 28 98 %   10/12/21 1050 (!) 165/93 98.6 °F (37 °C) (!) 112 (!) 31 95 %   10/12/21 0737 (!) 155/90 98.6 °F (37 °C) (!) 108 (!) 33 97 %   10/12/21 0330   (!) 106 (!) 35 95 %   10/12/21 0000   (!) 109 (!) 32 93 %         Lab Review   Recent Results (from the past 24 hour(s))   GLUCOSE, POC    Collection Time: 10/12/21  8:07 AM   Result Value Ref Range    Glucose (POC) 200 (H) 65 - 117 mg/dL    Performed by Didi Moore RN    GLUCOSE, POC    Collection Time: 10/12/21 10:58 AM   Result Value Ref Range    Glucose (POC) 170 (H) 65 - 117 mg/dL    Performed by Maria Del Carmen DIETZ            Additional comments:I personally viewed and interpreted the patient's CTAs and MRIs are reviewed personally on the PACS system.   MRI Results (most recent):  Results from [facility] encounter on 10/07/21    MRI BRAIN WO CONT    Narrative  EXAM: MRI BRAIN WO CONT    INDICATION: stroke workup    COMPARISON: CTA head neck 10/7/2021. CONTRAST: None. TECHNIQUE:  Multiplanar multisequence acquisition without contrast of the brain. FINDINGS:  Multiple acute to subacute infarcts scattered throughout the right frontal and  parietal lobes involving the cortex and white matter, largest involving the  right middle frontal gyrus. Additional punctate acute to subacute infarcts noted  in the bilateral occipital lobes. Generalized parenchymal volume loss with commensurate dilation of the sulci and  ventricular system. Scattered periventricular and deep white matter T2/FLAIR  hyperintensities, consistent with mild chronic microvascular ischemic disease. Small chronic infarcts in the left occipital lobe and right justus. There is no  acute hemorrhage, extra-axial fluid collection, or mass effect. There is no  cerebellar tonsillar herniation. Expected arterial flow-voids are present. Scattered mucosal thickening in the left frontal sinus, and bilateral ethmoidal  air cells without air-fluid level. The mastoid air cells and middle ears are  clear. The orbital contents are within normal limits. No significant osseous or  scalp lesions are identified. Impression  1. Multiple acute to subacute infarcts scattered throughout the right frontal  and parietal lobes (MCA territory), largest in the right middle frontal gyrus. Additional punctate acute to subacute infarcts in the bilateral occipital lobes. 2. Generalized parenchymal volume loss and mild chronic microvascular ischemic  disease. Small chronic infarcts in the left occipital lobe and right justus. Results from [place] encounter on 10/07/21    MRI BRAIN WO CONT    Narrative  EXAM: MRI BRAIN WO CONT    INDICATION: stroke workup    COMPARISON: CTA head neck 10/7/2021. CONTRAST: None. TECHNIQUE:  Multiplanar multisequence acquisition without contrast of the brain.     FINDINGS:  Multiple acute to subacute infarcts scattered throughout the right frontal and  parietal lobes involving the cortex and white matter, largest involving the  right middle frontal gyrus. Additional punctate acute to subacute infarcts noted  in the bilateral occipital lobes. Generalized parenchymal volume loss with commensurate dilation of the sulci and  ventricular system. Scattered periventricular and deep white matter T2/FLAIR  hyperintensities, consistent with mild chronic microvascular ischemic disease. Small chronic infarcts in the left occipital lobe and right justus. There is no  acute hemorrhage, extra-axial fluid collection, or mass effect. There is no  cerebellar tonsillar herniation. Expected arterial flow-voids are present. Scattered mucosal thickening in the left frontal sinus, and bilateral ethmoidal  air cells without air-fluid level. The mastoid air cells and middle ears are  clear. The orbital contents are within normal limits. No significant osseous or  scalp lesions are identified. Impression  1. Multiple acute to subacute infarcts scattered throughout the right frontal  and parietal lobes (MCA territory), largest in the right middle frontal gyrus. Additional punctate acute to subacute infarcts in the bilateral occipital lobes. 2. Generalized parenchymal volume loss and mild chronic microvascular ischemic  disease. Small chronic infarcts in the left occipital lobe and right justus. NEUROLOGICAL EXAM:    Appearance: The patient is poorly developed and nourished, provides a poor history and is in no acute distress. Mental Status:  Patient is nonverbal and very lethargic and obtunded, appropriate but very lethargic and encephalopathic. Cranial Nerves:    Unreliable visual fields. Fundi are benign but poorly seen SUE, EOM's full, no nystagmus, no ptosis. Facial sensation is normal. Corneal reflexes are not tested. Facial movement is weak on the left. Hearing is abnormal bilaterally.  Palate is midline with normal sternocleidomastoid and trapezius muscles are normal. Tongue is midline. Neck without meningismus or bruits   Motor:   Patient flaccid on the left, and withdraws slightly to pain on the right   Reflexes:   Deep tendon reflexes 1+/4 and symmetrical.  No babinski or clonus present   Sensory:    Patient decerebrates to pain on the left and has nonpurposeful withdrawal weakly on the right   Gait:  Not testable. Tremor:   No tremor noted. Cerebellar:  Not testable abnormal cerebellar signs present on Romberg, tandem, and finger-nose-finger exam.   Neurovascular:  Normal heart sounds and regular rhythm, peripheral pulses decreased, and no carotid bruits. Assessment:         ICD-10-CM ICD-9-CM    1. Ischemic stroke of frontal lobe ([facility])  I63.9 434.91    2. Chronic ischemic right MCA stroke  I69.30 V12.54    3. Somnolence  R40.0 780.09    4. Bilateral carotid artery stenosis  I65.23 433.10      433.30    5. Basilar artery stenosis  I65.1 433.00      Active Problems:    Stroke (cerebrum) ([facility]) (92/7/7800)      Embolic stroke involving right middle cerebral artery ([facility] (10/9/2021)      Bilateral carotid artery stenosis (10/9/2021)      Basilar artery stenosis, symptomatic, without infarction (10/9/2021)      Vasogenic cerebral edema ([facility] (10/10/2021)        Plan:     Patient with progressive edema from his stroke, now with increased midline herniation and brainstem compression and further herniation and now compression of the third ventricle and progressive hydrocephalus of the left lateral ventricles, associated with some subarachnoid and petechial hemorrhages in the areas of infarction and prognosis year is almost certainly hopeless and patient has a terminal disease. Family has decided on no surgery.   The patient has been made palliative and comfort care and will be transferred to that service, and we will follow as needed, support is being withdrawn and comfort measures are in place      Signed:  Markus Marte Paco Mullins MD  10/12/2021  11:00 AM    Taj Anthony MD  [phone]

## 2021-10-12 NOTE — PROGRESS NOTES
0700: bedside shift report received from Dangelo Chan and Jerrod Jimenez RN. Report consisted of MAR, Kardex, SBAR, Recent results, and cardiac rhythm     0737: Vital signs obtained and morning assessment performed.

## 2021-10-12 NOTE — PROGRESS NOTES
TRANSFER - IN REPORT:    Verbal report received from Terrie(name) on [facility]  being received from CCU(unit) for routine progression of care      Report consisted of patients Situation, Background, Assessment and   Recommendations(SBAR). Information from the following report(s) SBAR, Kardex and ED Summary was reviewed with the receiving nurse. Opportunity for questions and clarification was provided. Assessment completed upon patients arrival to unit and care assumed.

## 2021-10-12 NOTE — PROGRESS NOTES
End of Shift Note    Bedside shift change report given to IRINA Cordova (oncoming nurse) by Chika Cohen RN (offgoing nurse). Report included the following information SBAR, Kardex, Intake/Output, MAR and Recent Results    Shift worked:  night     Shift summary and any significant changes:     Pt rested comfortably all night, HR sustaining low 100-110. Remains on comfort measures     Concerns for physician to address:       Zone phone for oncoming shift:          Activity:  Activity Level: Bed Rest  Number times ambulated in hallways past shift: 0  Number of times OOB to chair past shift: 0    Cardiac:   Cardiac Monitoring: Yes      Cardiac Rhythm: Sinus Tachy    Access:   Current line(s): PIV     Genitourinary:   Urinary status: incontinent and external catheter    Respiratory:   O2 Device: Nasal cannula  Chronic home O2 use?: NO  Incentive spirometer at bedside: N/A     GI:  Last Bowel Movement Date:  (unknown)  Current diet:  ADULT DIET Regular; 4 carb choices (60 gm/meal)  Passing flatus: YES  Tolerating current diet: NO       Pain Management:   Patient states pain is manageable on current regimen: N/A    Skin:  Niraj Score: 9  Interventions: speciality bed, float heels, limit briefs and internal/external urinary devices    Patient Safety:  Fall Score:  Total Score: 2  Interventions: bed/chair alarm and gripper socks  High Fall Risk: Yes    Length of Stay:  Expected LOS: 2d 0h  Actual LOS: [address], RN

## 2021-10-12 NOTE — PROGRESS NOTES
Hospitalist Progress Note    NAME: Sanjuanita Zepeda   :  1955   MRN:  [id number]     Interim Hospital Summary: 77 y.o. 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 The left  vertebral artery is dominant with a right vertebral artery terminates in PICA  there is retrograde filling of the basilar artery via posterior communicating  artery on the right. Central pontine hypodensity is concerning for acute infarction. Moderate to severe stenosis proximal M2 anterior division segment with small 3.5  x 2 mm aneurysm associated with this region. Right frontal infarction is subacute. Moderate chronic microvascular ischemic change. Mild cervical atherosclerotic vasculopathy. Mild to moderate stenosis of the supraclinoid ICA on the right. MRI Brain:IMPRESSION  1. Multiple acute to subacute infarcts scattered throughout the right frontal  and parietal lobes (MCA territory), largest in the right middle frontal gyrus. Additional punctate acute to subacute infarcts in the bilateral occipital lobes. 2. Generalized parenchymal volume loss and mild chronic microvascular ischemic  disease. Small chronic infarcts in the left occipital lobe and right justus. CT Head 10/10: IMPRESSION  1.   Known right MCA infarct with increased edema, right left midline shift, mass effect, and increase effacement of the basilar cisterns.   2.  Scattered foci of hemorrhage throughout the infarct possibly petechial although hemorrhagic transformation is difficult to exclude. CT Head 10/11: IMPRESSION  Increased right left midline shift. Increased hydrocephalus and transependymal edema on the left. Sulcal congestion/petechial/subarachnoid hemorrhage not significantly changed    -EEG Normal  -Echo EF 55-60%, no AS, trace MR, no shunt seen  -LDL 96; -HgA1c 5.9      -10/10 discussed with daughter Kristan Moore) today and other family members (10/09 note). They understand that his near term prognosis is grim and that these medial measures may not halt the progression of his brain herniation and patient may ultimately succumb to the aggregate burden of all his acute neurologic problems. They understand that even if he is to survive this insult, he will be left with significant disability from his neurologic deficits and that his overall quality of life will be very poor. They do not want to pursue surgery at this time and want to focus on comfort if he continues to decline. -10/11. Needing 100% NRB. Was 93% on RA this AM.  Now unresponsive and CT showing herniation. Not survivable. Discussed with Neurology. Updated Alexandrea. Will consult Hospice. Discontinued cardene and mannitol. -10/12. On NC oxygen. Not in resp distress. Family to meet with Hospice today. Transfer to medical bed. Comfort care pathway     Acute hypoxic respiratory failure  Sepsis due to aspiration PNA  -10/09 fever 101, RR 34, congested, AMS with CXR New bilateral airspace disease, right greater than left  -blood cultures NG  -on NC  -stopped zosyn. Focus is now comfort    CAD  HTN  HLD  BPH  Hx of CVA in 2000  25.0 - 29.9 Overweight / Body mass index is 28.28 kg/m². Estimated discharge date: October 12 to hospice  Barriers:    Code status: Full  Prophylaxis: Lovenox  Recommended Disposition: Home w/Family       Subjective:     Chief Complaint / Reason for Physician Visit  Follow up of CVA, CAD, HTN  Chart reviewed in detail. Discussed with RN events overnight.    Remains unresponsive      Review of Systems:  Symptom Y/N Comments  Symptom Y/N Comments   Fever/Chills    Chest Pain     Poor Appetite    Edema     Cough    Abdominal Pain     Sputum    Joint Pain     SOB/JANE    Pruritis/Rash     Nausea/vomit    Tolerating PT/OT     Diarrhea    Tolerating Diet     Constipation    Other       Could NOT obtain due to: unresponsive     PO intake: No data found. Objective:     VITALS:   Last 24hrs VS reviewed since prior progress note. Most recent are:  Patient Vitals for the past 24 hrs:   Temp Pulse Resp BP SpO2   10/12/21 0737 98.6 °F (37 °C) (!) 108 (!) 33 (!) 155/90 97 %   10/12/21 0330  (!) 106 (!) 35  95 %   10/12/21 0000  (!) 109 (!) 32  93 %   10/11/21 1952 98.8 °F (37.1 °C) (!) 124 (!) 31 116/82 90 %   10/11/21 1400  (!) 140 30 (!) 111/93 92 %   10/11/21 1345  (!) 131 (!) 39 116/80 (!) 86 %   10/11/21 1330  95 29 125/77 96 %   10/11/21 1329  97 28  97 %   10/11/21 1328  95 28  96 %   10/11/21 1327  96 29  97 %   10/11/21 1326  95 29  96 %   10/11/21 1325  (!) 101 (!) 32  96 %   10/11/21 1324  97 24  96 %   10/11/21 1323  95 27  96 %   10/11/21 1322  98 30  96 %   10/11/21 1300 98.5 °F (36.9 °C) (!) 103 29 128/75 100 %   10/11/21 1151  92  127/78    10/11/21 1125 98.5 °F (36.9 °C) 86 24 130/65 90 %   10/11/21 1016  97  127/77        Intake/Output Summary (Last 24 hours) at 10/12/2021 0836  Last data filed at 10/12/2021 0675  Gross per 24 hour   Intake    Output 0 ml   Net 0 ml        I had a face to face encounter, and independently examined this patient on 10/12/2021, as outlined below:  PHYSICAL EXAM:  General: Unresponsive  EENT:  No spontaneous eye opening;  Pupils not reactive but looks equal  Resp:  CTA bilaterally, no wheezing or rales. No accessory muscle use  CV:  Regular  rhythm,  No edema  GI:  Soft, Non distended,    Neurologic:  Left flaccid  Psych:   Not agitated   Skin:  No rashes.   No jaundice    Reviewed most current lab test results and cultures  YES  Reviewed most current radiology test results   YES  Review and summation of old records today    NO  Reviewed patient's current orders and MAR    YES  PMH/SH reviewed - no change compared to H&P  ________________________________________________________________________  Care Plan discussed with:    Comments   Patient     Family      RN x    Care Manager     Consultant                        Multidiciplinary team rounds were held today with , nursing, pharmacist and clinical coordinator. Patient's plan of care was discussed; medications were reviewed and discharge planning was addressed. ________________________________________________________________________  Total NON critical care TIME:  10    Minutes    Total CRITICAL CARE TIME Spent:  Minutes non procedure based          Comments   >50% of visit spent in counseling and coordination of care x     This includes time during multidisciplinary rounds if indicated above   ________________________________________________________________________  Dilma Reyes MD     Procedures: see electronic medical records for all procedures/Xrays and details which were not copied into this note but were reviewed prior to creation of Plan. LABS:  I reviewed today's most current labs and imaging studies.   Pertinent labs include:  Recent Labs     10/10/21  0457   WBC 11.5*   HGB 16.9   HCT 45.4        Recent Labs     10/11/21  0253 10/10/21  1747 10/10/21  0458    139 135*   K 3.2* 3.6 3.4*   * 108 104   CO2 23 22 22   * 281* 244*   BUN 37* 33* 32*   CREA 1.26 1.43* 1.35*   CA 9.4 10.1 9.6   MG  --   --  2.0   ALB  --   --  2.8*   TBILI  --   --  2.0*   ALT  --   --  18

## 2021-10-12 NOTE — PROGRESS NOTES
0700: Bedside report taken from [address] [place] [address] and Medina Barth RN. Report consisted of Sbar, Kardex, MAR, Recent results, and cardiac rhythm. 9050: vitals obtained, morning assessment completed. Patient weaned to 4L O2.       1330: patient to be transferred to [facility]. Report called to Estephania Snow RN. Report consisted of SBAR, Kardex, code status, and MAR. Opportunity for questions and clarification provided. Patient will be transferred soon.

## 2021-10-13 NOTE — PROGRESS NOTES
Transition of Care Plan:     RUR:13%  Disposition: Home   Follow up appointments: None  DME needed: [employer]  Transportation at [address] or means to access home:     has keys    IM Medicare Letter: Given 10/11/2021  Is patient a BCPI-A Bundle:                      If yes, was Bundle Letter given?:     Caregiver Contact: Sister Julienne Romano [phone]  Discharge Caregiver contacted prior to discharge?  CM to contact     Chart reviewed. CM continuing to follow for discharge planning. Pt is not yet medically stable for discharge at this time. [facility] continuing to follow pt, however waiting for family to sign consent for [facility] hospice. Pt remains comfort care measures at this time. Will continue to follow should disposition needs arise.     Thor Hurley, MSW   Care Manager, [address] [facility]  [phone]

## 2021-10-13 NOTE — PROGRESS NOTES
I reviewed pertinent labs and imaging, and discussed /agreed on the plan of care with Dr. Kwasi Espinal. 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 The left  vertebral artery is dominant with a right vertebral artery terminates in PICA  there is retrograde filling of the basilar artery via posterior communicating  artery on the right. Central pontine hypodensity is concerning for acute infarction. Moderate to severe stenosis proximal M2 anterior division segment with small 3.5  x 2 mm aneurysm associated with this region. Right frontal infarction is subacute. Moderate chronic microvascular ischemic change. Mild cervical atherosclerotic vasculopathy. Mild to moderate stenosis of the supraclinoid ICA on the right.      MRI Brain:IMPRESSION  1. Multiple acute to subacute infarcts scattered throughout the right frontal  and parietal lobes (MCA territory), largest in the right middle frontal gyrus. Additional punctate acute to subacute infarcts in the bilateral occipital lobes. 2. Generalized parenchymal volume loss and mild chronic microvascular ischemic  disease.  Small chronic infarcts in the left occipital lobe and right justus.     CT Head 10/10: IMPRESSION  1.  Known right MCA infarct with increased edema, right left midline shift, mass effect, and increase effacement of the basilar cisterns.   2.  Scattered foci of hemorrhage throughout the infarct possibly petechial although hemorrhagic transformation is difficult to exclude.     CT Head 10/11: IMPRESSION  Increased right left midline shift. Increased hydrocephalus and transependymal edema on the left. Sulcal congestion/petechial/subarachnoid hemorrhage not significantly changed     -EEG Normal  -Echo EF 55-60%, no AS, trace MR, no shunt seen  -LDL 96; -HgA1c 5.9  10/10 discussed with daughter Danielle Nicolas today and other family members (10/09 note). They understand that his near term prognosis is grim and that these medial measures may not halt the progression of his brain herniation and patient may ultimately succumb to the aggregate burden of all his acute neurologic problems. They understand that even if he is to survive this insult, he will be left with significant disability from his neurologic deficits and that his overall quality of life will be very poor. They do not want to pursue surgery at this time and want to focus on comfort if he continues to decline.       -10/11. Needing 100% NRB. Was 93% on RA this AM.  Now unresponsive and CT showing herniation. Not survivable. Discussed with Neurology. Updated Alexandrea. Will consult Hospice. Discontinued cardene and mannitol.      -10/12. On [place] oxygen. Not in resp distress. Family to meet with Hospice today. Transfer to medical bed. Comfort care pathway    10/13  - comfort care measures in effect , cannot transition to Hospice since daughter cannot come to hospital to sign Hospice consents . Pt resting in bed , respond to pain     Estimated discharge date: October 15  Barriers: family dynamics     Code status: DNR  Prophylaxis: SCD's  Recommended Disposition: Hospice      Subjective:     Chief Complaint / Reason for Physician Visit  \"\". Discussed with RN events overnight.      Review of Systems:  Symptom Y/N Comments  Symptom Y/N Comments   Fever/Chills    Chest Pain     Poor Appetite    Edema     Cough    Abdominal Pain Sputum    Joint Pain     SOB/JANE    Pruritis/Rash     Nausea/vomit    Tolerating PT/OT     Diarrhea    Tolerating Diet     Constipation    Other       Could NOT obtain due to:      Objective:     VITALS:   Last 24hrs VS reviewed since prior progress note. Most recent are:  Patient Vitals for the past 24 hrs:   Temp Pulse Resp BP SpO2   10/13/21 0725 98.7 °F (37.1 °C) (!) 126 (!) 38 104/67 93 %   10/12/21 1935 (!) 101.2 °F (38.4 °C) (!) 121 26 (!) 122/56 97 %   10/12/21 1529 98 °F (36.7 °C) (!) 119 28 (!) 143/93 98 %     No intake or output data in the 24 hours ending 10/13/21 1455     I had a face to face encounter and independently examined this patient on 10/13/2021, as outlined below:  PHYSICAL EXAM:  General: WD, WN. Alert, cooperative, no acute distress    EENT:  EOMI. Anicteric sclerae. MMM  Resp:  CTA bilaterally, no wheezing or rales. No accessory muscle use  CV:  Regular  rhythm,  No edema  GI:  Soft, Non distended, Non tender. +Bowel sounds  Neurologic:  Alert and oriented X 3, normal speech,   Psych:   Good insight. Not anxious nor agitated  Skin:  No rashes. No jaundice    Reviewed most current lab test results and cultures  YES  Reviewed most current radiology test results   YES  Review and summation of old records today    NO  Reviewed patient's current orders and MAR    YES  PMH/ reviewed - no change compared to H&P  ________________________________________________________________________  Care Plan discussed with:    Comments   Patient     Family      RN     Care Manager     Consultant                        Multidiciplinary team rounds were held today with , nursing, pharmacist and clinical coordinator. Patient's plan of care was discussed; medications were reviewed and discharge planning was addressed.      ________________________________________________________________________  Total NON critical care TIME:  30   Minutes    Total CRITICAL CARE TIME Spent:   Minutes non procedure based      Comments   >50% of visit spent in counseling and coordination of care     ________________________________________________________________________  Reyes Cochran. Onesimo Springer NP     Procedures: see electronic medical records for all procedures/Xrays and details which were not copied into this note but were reviewed prior to creation of Plan. LABS:  I reviewed today's most current labs and imaging studies. Pertinent labs include:  No results for input(s): WBC, HGB, HCT, PLT, HGBEXT, HCTEXT, PLTEXT in the last 72 hours. Recent Labs     10/11/21  0253 10/10/21  1747    139   K 3.2* 3.6   * 108   CO2 23 22   * 281*   BUN 37* 33*   CREA 1.26 1.43*   CA 9.4 10.1       Signed: Ashly Springer, NP

## 2021-10-13 NOTE — PROGRESS NOTES
.End of Shift Note    Bedside shift change report given to  (oncoming nurse) by Chuck Souza RN (offgoing nurse). Report included the following information SBAR, Procedure Summary, Intake/Output, MAR and Recent Results    Shift worked:   7 a - 7 p     Shift summary and any significant changes:     Patient on comfort measures. No family at bedside today. Concerns for physician to address:  none     Zone phone for oncoming shift:   [phone]       Activity:  Activity Level: Bed Rest  Number times ambulated in hallways past shift: 0  Number of times OOB to chair past shift: 0    Cardiac:   Cardiac Monitoring: No      Cardiac Rhythm: Sinus Tachy    Access:   Current line(s): PIV     Genitourinary:   Urinary status: external catheter    Respiratory:   O2 Device: None (Room air)  Chronic home O2 use?: NO  Incentive spirometer at bedside: NO     GI:  Last Bowel Movement Date:  (unknown)  Current diet:  No diet orders on file  Passing flatus: NO  Tolerating current diet: NO       Pain Management:   Patient states pain is manageable on current regimen: N/A    Skin:  Niraj Score: 9  Interventions: turn team    Patient Safety:  Fall Score:  Total Score: 2  Interventions: gripper socks  High Fall Risk: Yes    Length of Stay:  Expected LOS: 2d 0h  Actual LOS: 1645 Helena Olvera RN

## 2021-10-13 NOTE — HOSPICE
Hospice Liaison Note:    T/c to Alexandrea yesterday to update her on patient's status and new room assignment. Alexandrea expressed that she is very sick herself with COPD, asthma and anxiety and has been having a COPD flare up. She said it is really hard for her to get to the hospital and we are causing her anxiety by calling and asking her to come in and sign paperwork. I explained that he is on comfort measures with the hospital but could be formally admitted to inpatient hospice so that we could better manage his EOL symptoms. She told me she does not have e-mail so would not have the ability to do docusign consents and she most likely will not be able to come back to the hospital but if she does she will call me. She stated that she wants us to \"do whatever we need to do to make him as comfortable as possible. \" I discussed this with bedside RN Monica Galvan yesterday as well and spoke with CM today. If Alexandrea was to come to hospital please inform hospice so that we can have her sign consents and formally admit to inpatient hospice for symptom management. Otherwise, hospice is available to offer support and make recommendations for medication adjustments to keep patient comfortable.        Marisol Bhandari RN  Hospice Nurse Liaison  [phone]

## 2021-10-13 NOTE — PROGRESS NOTES
Nutrition Note    Chart reviewed for LOS. Pt on comfort measures only at this time. IP hospice pending paperwork. Nutrition will sign off, available by consult if needed.      Electronically signed by Nery Velasquez RD on 10/13/2021 at 12:19 PM    Contact: [phone]

## 2021-10-14 NOTE — PROGRESS NOTES
I was called to see Mr. Scott Willis due to patient's death. On my arrival, He was lying in bed; there was no response to verbal or painful stimuli; pupils were fixed and dilated; there was no pulse, no respirations and no heart sounds.   Mona Munoz was pronounced dead at 8:50 PM.      Magali Freedman, NP  8:50 PM, 10/13/2021

## 2021-10-14 NOTE — PROGRESS NOTES
Shelby from [address] [place] [address] called to release this patient for any donations and informs this writer that [employer] has released as well.

## 2021-10-14 NOTE — PROGRESS NOTES
Spiritual Care Assessment/Progress Note  [facility]      NAME: Adilia Chavez      MRN: [id number]  AGE: 77 y.o. SEX: male  Restoration Affiliation: No Synagogue   Language: English     10/13/2021     Total Time (in minutes): 14     Spiritual Assessment begun in [facility] through conversation with:         []Patient        [x] Family    [] Friend(s)        Reason for Consult: Death, ER, Grief concerns, /memorial planning     Spiritual beliefs: (Please include comment if needed)     [] Identifies with a amado tradition:         [] Supported by a amado community:            [x] Claims no spiritual orientation:           [] Seeking spiritual identity:                [] Adheres to an individual form of spirituality:           [] Not able to assess:                           Identified resources for coping:      [] Prayer                               [] Music                  [] Guided Imagery     [x] Family/friends                 [] Pet visits     [] Devotional reading                         [] Unknown     [] Other:                                               Interventions offered during this visit: (See comments for more details)    Patient Interventions: Initial visit, Other (comment)     Family/Friend(s):  Affirmation of emotions/emotional suffering, Catharsis/review of pertinent events in supportive environment, Coping skills reviewed/reinforced     Plan of Care:     [] Support spiritual and/or cultural needs    [] Support AMD and/or advance care planning process      [x] Support grieving process   [] Coordinate Rites and/or Rituals    [] Coordination with community clergy   [] No spiritual needs identified at this time   [] Detailed Plan of Care below (See Comments)  [] Make referral to Music Therapy  [] Make referral to Pet Therapy     [] Make referral to Addiction services  [] Make referral to [facility]  [] Make referral to Spiritual Care Partner  [] No future visits requested        [x] Follow up upon further referrals     Comments:      responded to the death notification of Mr. Vu Baxter in [phone]. Patient's three sisters arrived,  provided deepest condolence and grief support. They shared the information of [facility]  ([facility] at [facility]). They wanted to talk with bedside RN,  informed bedside RN Ortamika Lindsey that family needs her. They were thankful for 's bereavement ministry.       [address] [facility] Elio Waggoner,.M, [address] Provider   Paging Service [phone])

## 2021-10-15 LAB
BACTERIA SPEC CULT: NORMAL
SERVICE CMNT-IMP: NORMAL

## 2021-10-17 NOTE — DISCHARGE SUMMARY
Provider Death Summary              Pt Name  Batsheva Mackenzie   Admit date:  10/7/2021   Date of death:  10/14/2021   Room Number  1123/01    Medical Record Number  [id number] @ [facility]   Age  77 y.o. Date of Birth 1955   PCP Cece López MD     Admission Diagnoses:                        <principal problem not specified>   Present on Admission:   Stroke (cerebrum) ([address]   Embolic stroke involving right middle cerebral artery ([address]   Bilateral carotid artery stenosis   Basilar artery stenosis, symptomatic, without infarction   Vasogenic cerebral edema (HCC)       No Known Allergies     Excerpt from HPI :   CHIEF COMPLAINT: lethargy, talking funny     HISTORY OF PRESENT ILLNESS:     Batsheva Mackenzie is a 77 y.o.  AA male who presents with CC listed above. Pt's family is unaware of pt's last known well time. Pt denies any complaints at this time but his family state that his mental status is not at baseline. EMS was called because of AMS. Pt denies any history of fever, chills, chest pain or syncope. Hospital Course: This pt was admitted with  Stroke > on 10/7/2021. 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 The left  vertebral artery is dominant with a right vertebral artery terminates in PICA  there is retrograde filling of the basilar artery via posterior communicating  artery on the right. Central pontine hypodensity is concerning for acute infarction.   Moderate to severe stenosis proximal M2 anterior division segment with small 3.5  x 2 mm aneurysm associated with this region. Right frontal infarction is subacute. Moderate chronic microvascular ischemic change. Mild cervical atherosclerotic vasculopathy. Mild to moderate stenosis of the supraclinoid ICA on the right.      MRI Brain:IMPRESSION  1. Multiple acute to subacute infarcts scattered throughout the right frontal  and parietal lobes (MCA territory), largest in the right middle frontal gyrus. Additional punctate acute to subacute infarcts in the bilateral occipital lobes. 2. Generalized parenchymal volume loss and mild chronic microvascular ischemic  disease. Small chronic infarcts in the left occipital lobe and right justus.     CT Head 10/10: IMPRESSION  1.  Known right MCA infarct with increased edema, right left midline shift, mass effect, and increase effacement of the basilar cisterns.   2.  Scattered foci of hemorrhage throughout the infarct possibly petechial although hemorrhagic transformation is difficult to exclude.     CT Head 10/11: IMPRESSION  Increased right left midline shift. Increased hydrocephalus and transependymal edema on the left. Sulcal congestion/petechial/subarachnoid hemorrhage not significantly changed     -EEG Normal  -Echo EF 55-60%, no AS, trace MR, no shunt seen  -LDL 96; -HgA1c 5.9  10/10 discussed with daughter Judy Henderson) today and other family members (10/09 note). Alina Suggs understand that his near term prognosis is grim and that these medial measures may not halt the progression of his brain herniation and patient may ultimately succumb to the aggregate burden of all his acute neurologic problems. Alina Suggs understand that even if he is to survive this insult, he will be left with significant disability from his neurologic deficits and that his overall quality of life will be very poor. Yue Rogelioissa do not want to pursue surgery at this time and want to focus on comfort if he continues to decline.       -10/11.  Needing 100% NRB.  Was 93% on RA this AM.  Now unresponsive and CT showing herniation.  Not survivable.  Discussed with Neurology. Darbyspvera Crutch consult Hospice. Discontinued cardene and mannitol.      -10/12.  On [place] oxygen.  Not in resp distress.  Family to meet with Hospice today.  Transfer to medical bed.  Comfort care pathway    10/13  - comfort care measures in effect , cannot transition to Hospice since daughter cannot come to hospital to sign Hospice consents . Pt resting in bed , respond to pain          Treatment team Treatment Team: Consulting Provider: Antolin Iyer MD; Consulting Provider: Serge Gonsales MD; Utilization Review: Savanna Ma RN; Utilization Review: Carolina Og RN; Consulting Provider: Javier Singh MD; Care Manager: Dirk Resendez; Nurse Practitioner: Abril De La Rosa NP; Care Manager: Tena Nageotte; Primary Nurse: Jaye Patterson RN   Consultations  IP CONSULT TO NEUROLOGY   Procedures/Surgeries  * No surgery found *   Query  None noted today      Other medical conditions are listed in the active hospital problem list section; these and other pertinent data were taken into consideration when the treatment plan was developed and customized to this patient's unique overall circumstances and needs.     Today total floor/unit time was 35 minutes while caring for this patient and greater than 50% of that time was spent with patient (and/or family) coordinating patients clinical issues     Gia SALCEDO - [facility]                            10/17/2021